# Patient Record
Sex: MALE | Race: NATIVE HAWAIIAN OR OTHER PACIFIC ISLANDER | Employment: FULL TIME | URBAN - METROPOLITAN AREA
[De-identification: names, ages, dates, MRNs, and addresses within clinical notes are randomized per-mention and may not be internally consistent; named-entity substitution may affect disease eponyms.]

---

## 2021-04-16 ENCOUNTER — APPOINTMENT (OUTPATIENT)
Dept: GENERAL RADIOLOGY | Age: 46
DRG: 177 | End: 2021-04-16
Attending: EMERGENCY MEDICINE

## 2021-04-16 ENCOUNTER — HOSPITAL ENCOUNTER (INPATIENT)
Age: 46
LOS: 5 days | Discharge: HOME | DRG: 177 | End: 2021-04-21
Attending: EMERGENCY MEDICINE | Admitting: INTERNAL MEDICINE

## 2021-04-16 DIAGNOSIS — U07.1 COVID-19: ICD-10-CM

## 2021-04-16 DIAGNOSIS — R09.02 HYPOXIA: Primary | ICD-10-CM

## 2021-04-16 PROBLEM — R65.10 SIRS (SYSTEMIC INFLAMMATORY RESPONSE SYNDROME) (HCC): Status: ACTIVE | Noted: 2021-04-16

## 2021-04-16 LAB
ALBUMIN SERPL-MCNC: 3.4 G/DL (ref 3.5–5)
ALBUMIN/GLOB SERPL: 0.8 {RATIO} (ref 1.1–2.2)
ALP SERPL-CCNC: 75 U/L (ref 45–117)
ALT SERPL-CCNC: 36 U/L (ref 12–78)
ANION GAP SERPL CALC-SCNC: 4 MMOL/L (ref 5–15)
AST SERPL-CCNC: 37 U/L (ref 15–37)
BASOPHILS # BLD: 0 K/UL (ref 0–0.1)
BASOPHILS NFR BLD: 0 % (ref 0–1)
BILIRUB SERPL-MCNC: 0.4 MG/DL (ref 0.2–1)
BUN SERPL-MCNC: 8 MG/DL (ref 6–20)
BUN/CREAT SERPL: 12 (ref 12–20)
CALCIUM SERPL-MCNC: 8.4 MG/DL (ref 8.5–10.1)
CHLORIDE SERPL-SCNC: 105 MMOL/L (ref 97–108)
CO2 SERPL-SCNC: 27 MMOL/L (ref 21–32)
COMMENT, HOLDF: NORMAL
COVID-19 RAPID TEST, COVR: DETECTED
CREAT SERPL-MCNC: 0.67 MG/DL (ref 0.7–1.3)
CRP SERPL-MCNC: 8.71 MG/DL (ref 0–0.6)
CRP SERPL-MCNC: 9.09 MG/DL (ref 0–0.6)
D DIMER PPP FEU-MCNC: 0.57 MG/L FEU (ref 0–0.65)
DIFFERENTIAL METHOD BLD: ABNORMAL
EOSINOPHIL # BLD: 0 K/UL (ref 0–0.4)
EOSINOPHIL NFR BLD: 0 % (ref 0–7)
ERYTHROCYTE [DISTWIDTH] IN BLOOD BY AUTOMATED COUNT: 12.3 % (ref 11.5–14.5)
GLOBULIN SER CALC-MCNC: 4.5 G/DL (ref 2–4)
GLUCOSE SERPL-MCNC: 109 MG/DL (ref 65–100)
HCT VFR BLD AUTO: 46.1 % (ref 36.6–50.3)
HGB BLD-MCNC: 16 G/DL (ref 12.1–17)
IMM GRANULOCYTES # BLD AUTO: 0 K/UL (ref 0–0.04)
IMM GRANULOCYTES NFR BLD AUTO: 0 % (ref 0–0.5)
LYMPHOCYTES # BLD: 1 K/UL (ref 0.8–3.5)
LYMPHOCYTES NFR BLD: 15 % (ref 12–49)
MCH RBC QN AUTO: 30.2 PG (ref 26–34)
MCHC RBC AUTO-ENTMCNC: 34.7 G/DL (ref 30–36.5)
MCV RBC AUTO: 87.1 FL (ref 80–99)
MONOCYTES # BLD: 0.4 K/UL (ref 0–1)
MONOCYTES NFR BLD: 7 % (ref 5–13)
NEUTS SEG # BLD: 5 K/UL (ref 1.8–8)
NEUTS SEG NFR BLD: 78 % (ref 32–75)
NRBC # BLD: 0 K/UL (ref 0–0.01)
NRBC BLD-RTO: 0 PER 100 WBC
PLATELET # BLD AUTO: 198 K/UL (ref 150–400)
PMV BLD AUTO: 10.2 FL (ref 8.9–12.9)
POTASSIUM SERPL-SCNC: 3.6 MMOL/L (ref 3.5–5.1)
PROCALCITONIN SERPL-MCNC: <0.05 NG/ML
PROT SERPL-MCNC: 7.9 G/DL (ref 6.4–8.2)
RBC # BLD AUTO: 5.29 M/UL (ref 4.1–5.7)
SAMPLES BEING HELD,HOLD: NORMAL
SODIUM SERPL-SCNC: 136 MMOL/L (ref 136–145)
SOURCE, COVRS: ABNORMAL
TROPONIN I SERPL-MCNC: <0.05 NG/ML
TROPONIN I SERPL-MCNC: <0.05 NG/ML
WBC # BLD AUTO: 6.5 K/UL (ref 4.1–11.1)

## 2021-04-16 PROCEDURE — 36415 COLL VENOUS BLD VENIPUNCTURE: CPT

## 2021-04-16 PROCEDURE — 86140 C-REACTIVE PROTEIN: CPT

## 2021-04-16 PROCEDURE — 71045 X-RAY EXAM CHEST 1 VIEW: CPT

## 2021-04-16 PROCEDURE — 74011250636 HC RX REV CODE- 250/636: Performed by: INTERNAL MEDICINE

## 2021-04-16 PROCEDURE — 99284 EMERGENCY DEPT VISIT MOD MDM: CPT

## 2021-04-16 PROCEDURE — XW033E5 INTRODUCTION OF REMDESIVIR ANTI-INFECTIVE INTO PERIPHERAL VEIN, PERCUTANEOUS APPROACH, NEW TECHNOLOGY GROUP 5: ICD-10-PCS | Performed by: INTERNAL MEDICINE

## 2021-04-16 PROCEDURE — 86738 MYCOPLASMA ANTIBODY: CPT

## 2021-04-16 PROCEDURE — 65660000000 HC RM CCU STEPDOWN

## 2021-04-16 PROCEDURE — 74011250637 HC RX REV CODE- 250/637: Performed by: INTERNAL MEDICINE

## 2021-04-16 PROCEDURE — 80053 COMPREHEN METABOLIC PANEL: CPT

## 2021-04-16 PROCEDURE — 93005 ELECTROCARDIOGRAM TRACING: CPT

## 2021-04-16 PROCEDURE — 74011000258 HC RX REV CODE- 258: Performed by: INTERNAL MEDICINE

## 2021-04-16 PROCEDURE — 85379 FIBRIN DEGRADATION QUANT: CPT

## 2021-04-16 PROCEDURE — 87635 SARS-COV-2 COVID-19 AMP PRB: CPT

## 2021-04-16 PROCEDURE — 85025 COMPLETE CBC W/AUTO DIFF WBC: CPT

## 2021-04-16 PROCEDURE — 74011000250 HC RX REV CODE- 250: Performed by: INTERNAL MEDICINE

## 2021-04-16 PROCEDURE — 84145 PROCALCITONIN (PCT): CPT

## 2021-04-16 PROCEDURE — 84484 ASSAY OF TROPONIN QUANT: CPT

## 2021-04-16 RX ORDER — SODIUM CHLORIDE 0.9 % (FLUSH) 0.9 %
5-40 SYRINGE (ML) INJECTION EVERY 8 HOURS
Status: DISCONTINUED | OUTPATIENT
Start: 2021-04-16 | End: 2021-04-21 | Stop reason: HOSPADM

## 2021-04-16 RX ORDER — HYDRALAZINE HYDROCHLORIDE 20 MG/ML
20 INJECTION INTRAMUSCULAR; INTRAVENOUS
Status: DISCONTINUED | OUTPATIENT
Start: 2021-04-16 | End: 2021-04-21 | Stop reason: HOSPADM

## 2021-04-16 RX ORDER — NALOXONE HYDROCHLORIDE 0.4 MG/ML
0.4 INJECTION, SOLUTION INTRAMUSCULAR; INTRAVENOUS; SUBCUTANEOUS AS NEEDED
Status: DISCONTINUED | OUTPATIENT
Start: 2021-04-16 | End: 2021-04-21 | Stop reason: HOSPADM

## 2021-04-16 RX ORDER — SODIUM CHLORIDE 9 MG/ML
75 INJECTION, SOLUTION INTRAVENOUS CONTINUOUS
Status: DISPENSED | OUTPATIENT
Start: 2021-04-16 | End: 2021-04-17

## 2021-04-16 RX ORDER — SODIUM CHLORIDE 0.9 % (FLUSH) 0.9 %
5-40 SYRINGE (ML) INJECTION AS NEEDED
Status: DISCONTINUED | OUTPATIENT
Start: 2021-04-16 | End: 2021-04-21 | Stop reason: HOSPADM

## 2021-04-16 RX ORDER — BENZONATATE 100 MG/1
100 CAPSULE ORAL
Status: DISCONTINUED | OUTPATIENT
Start: 2021-04-16 | End: 2021-04-21 | Stop reason: HOSPADM

## 2021-04-16 RX ORDER — DEXAMETHASONE 6 MG/1
6 TABLET ORAL DAILY
Status: DISCONTINUED | OUTPATIENT
Start: 2021-04-17 | End: 2021-04-21 | Stop reason: HOSPADM

## 2021-04-16 RX ORDER — ONDANSETRON 2 MG/ML
4 INJECTION INTRAMUSCULAR; INTRAVENOUS
Status: DISCONTINUED | OUTPATIENT
Start: 2021-04-16 | End: 2021-04-21 | Stop reason: HOSPADM

## 2021-04-16 RX ORDER — HYDROCODONE BITARTRATE AND ACETAMINOPHEN 5; 325 MG/1; MG/1
1 TABLET ORAL
Status: DISCONTINUED | OUTPATIENT
Start: 2021-04-16 | End: 2021-04-21 | Stop reason: HOSPADM

## 2021-04-16 RX ORDER — ACETAMINOPHEN 325 MG/1
650 TABLET ORAL
Status: DISCONTINUED | OUTPATIENT
Start: 2021-04-16 | End: 2021-04-21 | Stop reason: HOSPADM

## 2021-04-16 RX ORDER — ZINC GLUCONATE 50 MG
50 TABLET ORAL DAILY
Status: DISCONTINUED | OUTPATIENT
Start: 2021-04-17 | End: 2021-04-21 | Stop reason: HOSPADM

## 2021-04-16 RX ORDER — ASCORBIC ACID 500 MG
500 TABLET ORAL 2 TIMES DAILY
Status: DISCONTINUED | OUTPATIENT
Start: 2021-04-16 | End: 2021-04-21 | Stop reason: HOSPADM

## 2021-04-16 RX ORDER — HYDROCODONE POLISTIREX AND CHLORPHENIRAMINE POLISTIREX 10; 8 MG/5ML; MG/5ML
5 SUSPENSION, EXTENDED RELEASE ORAL ONCE
Status: COMPLETED | OUTPATIENT
Start: 2021-04-16 | End: 2021-04-16

## 2021-04-16 RX ORDER — ENOXAPARIN SODIUM 100 MG/ML
40 INJECTION SUBCUTANEOUS EVERY 24 HOURS
Status: DISCONTINUED | OUTPATIENT
Start: 2021-04-16 | End: 2021-04-17

## 2021-04-16 RX ADMIN — Medication 10 ML: at 21:16

## 2021-04-16 RX ADMIN — HYDROCODONE POLISTIREX AND CHLORPHENIRAMINE POLISTIREX 5 ML: 10; 8 SUSPENSION, EXTENDED RELEASE ORAL at 20:33

## 2021-04-16 RX ADMIN — ENOXAPARIN SODIUM 40 MG: 40 INJECTION SUBCUTANEOUS at 19:01

## 2021-04-16 RX ADMIN — SODIUM CHLORIDE 75 ML/HR: 9 INJECTION, SOLUTION INTRAVENOUS at 18:37

## 2021-04-16 RX ADMIN — AZITHROMYCIN MONOHYDRATE 500 MG: 500 INJECTION, POWDER, LYOPHILIZED, FOR SOLUTION INTRAVENOUS at 18:37

## 2021-04-16 RX ADMIN — OXYCODONE HYDROCHLORIDE AND ACETAMINOPHEN 500 MG: 500 TABLET ORAL at 18:37

## 2021-04-16 RX ADMIN — Medication 10 ML: at 18:37

## 2021-04-16 RX ADMIN — REMDESIVIR 200 MG: 100 INJECTION, POWDER, LYOPHILIZED, FOR SOLUTION INTRAVENOUS at 20:34

## 2021-04-16 NOTE — ED PROVIDER NOTES
39-year-old gentleman without any significant medical history presents to the emergency department today with chief complaint of shortness of breath and cough. This is been going on for approximately past 5 to 6 days. He denies any fevers. He endorses chest pain. His shortness of breath is significantly worse with exertion. He denies any sick contacts or extremity edema. The history is provided by the patient and medical records. A  was used. Respiratory Distress  This is a new problem. The current episode started more than 2 days ago. The problem has been gradually worsening. Associated symptoms include sore throat, cough and chest pain. Pertinent negatives include no fever, no headaches, no vomiting, no abdominal pain, no rash, no leg pain and no leg swelling. No past medical history on file. No past surgical history on file. No family history on file.     Social History     Socioeconomic History    Marital status: Not on file     Spouse name: Not on file    Number of children: Not on file    Years of education: Not on file    Highest education level: Not on file   Occupational History    Not on file   Social Needs    Financial resource strain: Not on file    Food insecurity     Worry: Not on file     Inability: Not on file    Transportation needs     Medical: Not on file     Non-medical: Not on file   Tobacco Use    Smoking status: Not on file   Substance and Sexual Activity    Alcohol use: Not on file    Drug use: Not on file    Sexual activity: Not on file   Lifestyle    Physical activity     Days per week: Not on file     Minutes per session: Not on file    Stress: Not on file   Relationships    Social connections     Talks on phone: Not on file     Gets together: Not on file     Attends Hindu service: Not on file     Active member of club or organization: Not on file     Attends meetings of clubs or organizations: Not on file     Relationship status: Not on file    Intimate partner violence     Fear of current or ex partner: Not on file     Emotionally abused: Not on file     Physically abused: Not on file     Forced sexual activity: Not on file   Other Topics Concern    Not on file   Social History Narrative    Not on file         ALLERGIES: Patient has no known allergies. Review of Systems   Constitutional: Negative for fatigue and fever. HENT: Positive for sore throat. Negative for sneezing. Respiratory: Positive for cough and shortness of breath. Cardiovascular: Positive for chest pain. Negative for leg swelling. Gastrointestinal: Negative for abdominal pain, diarrhea, nausea and vomiting. Genitourinary: Negative for difficulty urinating and dysuria. Musculoskeletal: Negative for arthralgias and myalgias. Skin: Negative for color change and rash. Neurological: Negative for weakness and headaches. Psychiatric/Behavioral: Negative for agitation and behavioral problems. Vitals:    04/16/21 1513   BP: (!) 148/80   Pulse: (!) 121   Resp: (!) 35   Temp: 97.7 °F (36.5 °C)   SpO2: (!) 86%            Physical Exam  Vitals signs and nursing note reviewed. Constitutional:       General: He is not in acute distress. Appearance: He is well-developed. He is ill-appearing. He is not toxic-appearing or diaphoretic. HENT:      Head: Normocephalic and atraumatic. Nose: Nose normal.      Mouth/Throat:      Mouth: Mucous membranes are moist.      Pharynx: Oropharynx is clear. Eyes:      Extraocular Movements: Extraocular movements intact. Conjunctiva/sclera: Conjunctivae normal.      Pupils: Pupils are equal, round, and reactive to light. Neck:      Musculoskeletal: Normal range of motion and neck supple. No neck rigidity or muscular tenderness. Cardiovascular:      Rate and Rhythm: Regular rhythm. Tachycardia present. Pulses: Normal pulses. Heart sounds: No murmur.    Pulmonary:      Effort: Tachypnea and respiratory distress present. Breath sounds: Normal breath sounds. No stridor or decreased air movement. No wheezing. Chest:      Chest wall: No mass or tenderness. Abdominal:      General: There is no distension. Palpations: Abdomen is soft. Tenderness: There is no abdominal tenderness. There is no guarding or rebound. Musculoskeletal: Normal range of motion. General: No swelling, tenderness, deformity or signs of injury. Right lower leg: He exhibits no tenderness. No edema. Left lower leg: He exhibits no tenderness. No edema. Skin:     General: Skin is warm and dry. Capillary Refill: Capillary refill takes less than 2 seconds. Neurological:      General: No focal deficit present. Mental Status: He is alert and oriented to person, place, and time. Psychiatric:         Mood and Affect: Mood normal.         Behavior: Behavior normal.          MDM  Number of Diagnoses or Management Options  Diagnosis management comments: 78-year-old gentleman presents as above with Covid with hypoxia. Plan to admit to the hospital for further management. Amount and/or Complexity of Data Reviewed  Clinical lab tests: reviewed  Tests in the radiology section of CPT®: reviewed           Procedures    Perfect Serve Consult for Admission  5:15 PM    ED Room Number: ER20/20  Patient Name and age: Kameron Zeng 48 y.o.  male  Working Diagnosis:   1. Hypoxia    2.  COVID-19        COVID-19 Suspicion:  yes  Sepsis present:  no  Reassessment needed: N/A  Code Status:  Full Code  Readmission: no  Isolation Requirements:  yes  Recommended Level of Care:  telemetry  Department:[facility] ED - [phone]  Other: Patient has been sick for approximately 6 days

## 2021-04-16 NOTE — ED TRIAGE NOTES
Montserratian intrepreter used:   Pt c/o difficulty breathing, fever, and a cough for 5-6 days. Pt's breathing labored and SPO2 sats on RA in triage were 86-87%. Placed on 3L NC in triage. Also co chest pain.

## 2021-04-16 NOTE — ED NOTES
TRANSFER - OUT REPORT:    Verbal report given to Daina MORELOS(name) on [facility]  being transferred to 5th Floor(unit) for routine progression of care       Report consisted of patients Situation, Background, Assessment and   Recommendations(SBAR). Information from the following report(s) SBAR, ED Summary and MAR was reviewed with the receiving nurse. Lines:   Peripheral IV 04/16/21 Right Antecubital (Active)   Site Assessment Clean, dry, & intact 04/16/21 1626   Phlebitis Assessment 0 04/16/21 1626   Infiltration Assessment 0 04/16/21 1626   Dressing Status Clean, dry, & intact 04/16/21 1626   Dressing Type Tape 04/16/21 1626   Hub Color/Line Status Patent 04/16/21 1626   Action Taken Blood drawn 04/16/21 1626        Opportunity for questions and clarification was provided.       Patient transported with:   [employer]

## 2021-04-16 NOTE — PROGRESS NOTES
Bedside and Verbal shift change report given to SHERI IQBAL [facility] TAMY BABIN (oncoming nurse) by Maryan White RN (offgoing nurse). Report included the following information SBAR, Intake/Output, MAR and Recent Results.

## 2021-04-17 LAB
ALBUMIN SERPL-MCNC: 2.9 G/DL (ref 3.5–5)
ALBUMIN/GLOB SERPL: 0.7 {RATIO} (ref 1.1–2.2)
ALP SERPL-CCNC: 69 U/L (ref 45–117)
ALT SERPL-CCNC: 37 U/L (ref 12–78)
ANION GAP SERPL CALC-SCNC: 4 MMOL/L (ref 5–15)
AST SERPL-CCNC: 44 U/L (ref 15–37)
BASOPHILS # BLD: 0 K/UL (ref 0–0.1)
BASOPHILS NFR BLD: 0 % (ref 0–1)
BILIRUB SERPL-MCNC: 0.3 MG/DL (ref 0.2–1)
BUN SERPL-MCNC: 11 MG/DL (ref 6–20)
BUN/CREAT SERPL: 17 (ref 12–20)
CALCIUM SERPL-MCNC: 7.9 MG/DL (ref 8.5–10.1)
CHLORIDE SERPL-SCNC: 106 MMOL/L (ref 97–108)
CO2 SERPL-SCNC: 28 MMOL/L (ref 21–32)
COMMENT, HOLDF: NORMAL
CREAT SERPL-MCNC: 0.64 MG/DL (ref 0.7–1.3)
D DIMER PPP FEU-MCNC: 1.22 MG/L FEU (ref 0–0.65)
DIFFERENTIAL METHOD BLD: ABNORMAL
EOSINOPHIL # BLD: 0 K/UL (ref 0–0.4)
EOSINOPHIL NFR BLD: 0 % (ref 0–7)
ERYTHROCYTE [DISTWIDTH] IN BLOOD BY AUTOMATED COUNT: 12.1 % (ref 11.5–14.5)
FIBRINOGEN PPP-MCNC: 593 MG/DL (ref 200–475)
GLOBULIN SER CALC-MCNC: 4.2 G/DL (ref 2–4)
GLUCOSE SERPL-MCNC: 102 MG/DL (ref 65–100)
HCT VFR BLD AUTO: 43.9 % (ref 36.6–50.3)
HGB BLD-MCNC: 14.7 G/DL (ref 12.1–17)
IMM GRANULOCYTES # BLD AUTO: 0 K/UL (ref 0–0.04)
IMM GRANULOCYTES NFR BLD AUTO: 1 % (ref 0–0.5)
LDH SERPL L TO P-CCNC: 285 U/L (ref 85–241)
LYMPHOCYTES # BLD: 1.2 K/UL (ref 0.8–3.5)
LYMPHOCYTES NFR BLD: 20 % (ref 12–49)
MCH RBC QN AUTO: 29.7 PG (ref 26–34)
MCHC RBC AUTO-ENTMCNC: 33.5 G/DL (ref 30–36.5)
MCV RBC AUTO: 88.7 FL (ref 80–99)
MONOCYTES # BLD: 0.4 K/UL (ref 0–1)
MONOCYTES NFR BLD: 6 % (ref 5–13)
NEUTS SEG # BLD: 4.4 K/UL (ref 1.8–8)
NEUTS SEG NFR BLD: 73 % (ref 32–75)
NRBC # BLD: 0 K/UL (ref 0–0.01)
NRBC BLD-RTO: 0 PER 100 WBC
PLATELET # BLD AUTO: 224 K/UL (ref 150–400)
PMV BLD AUTO: 10.7 FL (ref 8.9–12.9)
POTASSIUM SERPL-SCNC: 3.6 MMOL/L (ref 3.5–5.1)
PROT SERPL-MCNC: 7.1 G/DL (ref 6.4–8.2)
RBC # BLD AUTO: 4.95 M/UL (ref 4.1–5.7)
SAMPLES BEING HELD,HOLD: NORMAL
SODIUM SERPL-SCNC: 138 MMOL/L (ref 136–145)
TROPONIN I SERPL-MCNC: <0.05 NG/ML
WBC # BLD AUTO: 6 K/UL (ref 4.1–11.1)

## 2021-04-17 PROCEDURE — 84484 ASSAY OF TROPONIN QUANT: CPT

## 2021-04-17 PROCEDURE — 77030027138 HC INCENT SPIROMETER -A

## 2021-04-17 PROCEDURE — 74011250636 HC RX REV CODE- 250/636: Performed by: PHYSICIAN ASSISTANT

## 2021-04-17 PROCEDURE — 36415 COLL VENOUS BLD VENIPUNCTURE: CPT

## 2021-04-17 PROCEDURE — 74011250636 HC RX REV CODE- 250/636: Performed by: INTERNAL MEDICINE

## 2021-04-17 PROCEDURE — 85384 FIBRINOGEN ACTIVITY: CPT

## 2021-04-17 PROCEDURE — 85379 FIBRIN DEGRADATION QUANT: CPT

## 2021-04-17 PROCEDURE — 85025 COMPLETE CBC W/AUTO DIFF WBC: CPT

## 2021-04-17 PROCEDURE — 74011250637 HC RX REV CODE- 250/637: Performed by: INTERNAL MEDICINE

## 2021-04-17 PROCEDURE — 74011000258 HC RX REV CODE- 258: Performed by: INTERNAL MEDICINE

## 2021-04-17 PROCEDURE — 74011000250 HC RX REV CODE- 250: Performed by: INTERNAL MEDICINE

## 2021-04-17 PROCEDURE — 65660000000 HC RM CCU STEPDOWN

## 2021-04-17 PROCEDURE — 80053 COMPREHEN METABOLIC PANEL: CPT

## 2021-04-17 PROCEDURE — 83615 LACTATE (LD) (LDH) ENZYME: CPT

## 2021-04-17 PROCEDURE — 94760 N-INVAS EAR/PLS OXIMETRY 1: CPT

## 2021-04-17 PROCEDURE — 77010033678 HC OXYGEN DAILY

## 2021-04-17 RX ORDER — ENOXAPARIN SODIUM 100 MG/ML
40 INJECTION SUBCUTANEOUS EVERY 12 HOURS
Status: DISCONTINUED | OUTPATIENT
Start: 2021-04-17 | End: 2021-04-21 | Stop reason: HOSPADM

## 2021-04-17 RX ADMIN — HYDROCODONE BITARTRATE AND ACETAMINOPHEN 1 TABLET: 5; 325 TABLET ORAL at 13:09

## 2021-04-17 RX ADMIN — Medication 10 ML: at 06:33

## 2021-04-17 RX ADMIN — Medication 10 ML: at 11:51

## 2021-04-17 RX ADMIN — Medication 10 ML: at 21:25

## 2021-04-17 RX ADMIN — OXYCODONE HYDROCHLORIDE AND ACETAMINOPHEN 500 MG: 500 TABLET ORAL at 17:11

## 2021-04-17 RX ADMIN — REMDESIVIR 100 MG: 100 INJECTION, POWDER, LYOPHILIZED, FOR SOLUTION INTRAVENOUS at 17:11

## 2021-04-17 RX ADMIN — ENOXAPARIN SODIUM 40 MG: 40 INJECTION SUBCUTANEOUS at 13:06

## 2021-04-17 RX ADMIN — BENZOCAINE AND MENTHOL 1 LOZENGE: 15; 3.6 LOZENGE ORAL at 09:36

## 2021-04-17 RX ADMIN — BENZOCAINE AND MENTHOL 1 LOZENGE: 15; 3.6 LOZENGE ORAL at 13:10

## 2021-04-17 RX ADMIN — DEXAMETHASONE 6 MG: 6 TABLET ORAL at 08:53

## 2021-04-17 RX ADMIN — Medication 50 MG: at 08:53

## 2021-04-17 RX ADMIN — OXYCODONE HYDROCHLORIDE AND ACETAMINOPHEN 500 MG: 500 TABLET ORAL at 08:53

## 2021-04-17 RX ADMIN — HYDROCODONE BITARTRATE AND ACETAMINOPHEN 1 TABLET: 5; 325 TABLET ORAL at 17:19

## 2021-04-17 NOTE — H&P
[facility]  [address], [facility] [address]  [phone]    Admission History and Physical      NAME:  Tim Aguiar   :   1975   MRN:  [id number]     PCP:  None     Date/Time:  2021         Subjective:     CHIEF COMPLAINT: \"I can't stop coughing and I am short of breath\"     HISTORY OF PRESENT ILLNESS:     Mr. Markel Mallory is a 48 y.o.  male with no known/reported PMH admitted for acute respiratory failure with hypoxia and COVID PNA. Per pt, has had roughly 6 days of progressively worsening cough, SOB, malaise. PMH   None     No past surgical history on file. SH:  Denies alcohol or drug use     FH   Brother => DM      No Known Allergies     No PTA meds     Review of Systems:  (bold if positive, if negative)    Gen:  Eyes:  ENT:  CVS:  Pulm:  GI:    :    MS:  Skin:  Psych:  Endo:    Hem:  Renal:    Neuro:     Cough, dyspnea      Objective:      VITALS:    Vital signs reviewed; most recent are:    Visit Vitals  /83 (BP 1 Location: Left upper arm, BP Patient Position: At rest)   Pulse 93   Temp 98 °F (36.7 °C)   Resp 24   Ht 5' (1.524 m)   Wt 91.4 kg (201 lb 6.4 oz)   SpO2 94%   BMI 39.33 kg/m²     SpO2 Readings from Last 6 Encounters:   21 94%        No intake or output data in the 24 hours ending 21         Exam:     Physical Exam:    Gen: Tachypneic. Increased WOB  HEENT:  Pink conjunctivae, PERRL, hearing intact to voice, moist mucous membranes  Neck:  Supple, without masses, thyroid non-tender  Resp: Coarse breath sounds diffusely.  No wheezing appreciated   Card:  No murmurs, normal S1, S2 without thrills, bruits or peripheral edema  Abd:  Soft, non-tender, non-distended, normoactive bowel sounds are present, no palpable organomegaly  Lymph:  No cervical adenopathy  Musc:  No cyanosis or clubbing  Skin:  No rashes or ulcers, skin turgor is good  Neuro:  Cranial nerves 3-12 are grossly intact,  strength is 5/5 bilaterally, dorsi / plantarflexion strength is 5/5 bilaterally, follows commands appropriately  Psych:  Alert with good insight. Oriented to person, place, and time       Labs:    Recent Labs     04/16/21  1617   WBC 6.5   HGB 16.0   HCT 46.1        Recent Labs     04/16/21  1617      K 3.6      CO2 27   *   BUN 8   CREA 0.67*   CA 8.4*   ALB 3.4*   ALT 36     No components found for: GLPOC  No results for input(s): PH, PCO2, PO2, HCO3, FIO2 in the last 72 hours. No results for input(s): INR, INREXT in the last 72 hours. CXR =>   Diffuse patchy opacification of the lungs most likely related to infection        Assessment/Plan:     Acute respiratory failure with hypoxia - 2/2 COVID PNA. D-dimer negative so lower suspicion for PE.  Procalcitonin low so unlikely bacterial PNA   -admit   -start O2   -start decadron 6mg PO daily x 10 days   -add azithro for now; de-escalate as able   -start remdesivir   -trend inflammatory markers   -incentive spirometry, OOB, prone   -if clinically worsens may be a candidate for Actemra       COVID-19 (4/16/2021)  -see above       SIRS (systemic inflammatory response syndrome) ([facility] [address] (4/16/2021) - likely 2/2 #1   -gently hydrate   -IV antibiotics as above     Surrogate decision maker: Dad     Total time spent with patient: 79 [phone] [facility] discussed with: Patient and Family    Discussed:  Code Status, Care Plan and D/C Planning    Prophylaxis:  Lovenox    Probable Disposition:  Home w/Family           ___________________________________________________    Attending Physician: Yenni Cooper MD

## 2021-04-17 NOTE — PROGRESS NOTES
[address]  [address]  [phone]      Medical Progress Note      NAME: Marci Koehler   :  1975  MRM:  [id number]    Date of service: 2021  7:25 AM       Assessment and Plan:   1. Acute respiratory failure with hypoxia - 2/2 COVID PNA. D-dimer negative so lower suspicion for PE. Procalcitonin low so unlikely bacterial PNA. Continue supplement oxygen to keep SAO2 > 90%     2. COVID-19 pneumonia (2021). Continue decadron 6mg PO daily x 10 days, remdesivir, ascorbic acid, zinc, trend inflammatory markers, incentive spirometry, OOB, prone   -if clinically worsens may be a candidate for Actemra              Subjective:     Chief Complaint[de-identified] Patient was seen and examined as a follow up for COVID 19 pneumonia. Chart was reviewed. c/o cough and SOB     ROS:  (bold if positive, if negative)    Tolerating PT  Tolerating Diet        Objective:     Last 24hrs VS reviewed since prior progress note. Most recent are:    Visit Vitals  /80 (BP 1 Location: Left upper arm, BP Patient Position: Sitting; At rest)   Pulse 94   Temp 98.6 °F (37 °C)   Resp 16   Ht 5' (1.524 m)   Wt 91.4 kg (201 lb 6.4 oz)   SpO2 97%   BMI 39.33 kg/m²     SpO2 Readings from Last 6 Encounters:   21 97%    O2 Flow Rate (L/min): 4 l/min   No intake or output data in the 24 hours ending 21 0725     Physical Exam:    Gen:  Well-developed, well-nourished, in no acute distress  HEENT:  Pink conjunctivae, PERRL, hearing intact to voice, moist mucous membranes  Neck:  Supple, without masses, thyroid non-tender  Resp:  No accessory muscle use, clear breath sounds without wheezes rales or rhonchi  Card:  No murmurs, normal S1, S2 without thrills, bruits or peripheral edema  Abd:  Soft, non-tender, non-distended, normoactive bowel sounds are present, no palpable organomegaly and no detectable hernias  Lymph:  No cervical or inguinal adenopathy  Musc:  No cyanosis or clubbing  Skin:  No rashes or ulcers, skin turgor is good  Neuro:  Cranial nerves are grossly intact, no focal motor weakness, follows commands appropriately  Psych:  Good insight, oriented to person, place and time, alert  __________________________________________________________________  Medications Reviewed: (see below)  Medications:     Current Facility-Administered Medications   Medication Dose Route Frequency    sodium chloride (NS) flush 5-40 mL  5-40 mL IntraVENous Q8H    sodium chloride (NS) flush 5-40 mL  5-40 mL IntraVENous PRN    enoxaparin (LOVENOX) injection 40 mg  40 mg SubCUTAneous Q24H    acetaminophen (TYLENOL) tablet 650 mg  650 mg Oral Q4H PRN    HYDROcodone-acetaminophen (NORCO) 5-325 mg per tablet 1 Tab  1 Tab Oral Q4H PRN    naloxone (NARCAN) injection 0.4 mg  0.4 mg IntraVENous PRN    ondansetron (ZOFRAN) injection 4 mg  4 mg IntraVENous Q4H PRN    ascorbic acid (vitamin C) (VITAMIN C) tablet 500 mg  500 mg Oral BID    zinc gluconate tablet 50 mg  50 mg Oral DAILY    dexAMETHasone (DECADRON) tablet 6 mg  6 mg Oral DAILY    0.9% sodium chloride infusion  75 mL/hr IntraVENous CONTINUOUS    hydrALAZINE (APRESOLINE) 20 mg/mL injection 20 mg  20 mg IntraVENous Q6H PRN    azithromycin (ZITHROMAX) 500 mg in 0.9% sodium chloride 250 mL (VIAL-MATE)  500 mg IntraVENous Q24H    remdesivir 100 mg in 0.9% sodium chloride 250 mL IVPB  100 mg IntraVENous Q24H    benzonatate (TESSALON) capsule 100 mg  100 mg Oral TID PRN        Lab Data Reviewed: (see below)  Lab Review:     Recent Labs     04/17/21  0504 04/16/21  1617   WBC 6.0 6.5   HGB 14.7 16.0   HCT 43.9 46.1    198     Recent Labs     04/17/21  0504 04/16/21  1617    136   K 3.6 3.6    105   CO2 28 27   * 109*   BUN 11 8   CREA 0.64* 0.67*   CA 7.9* 8.4*   ALB 2.9* 3.4*   TBILI 0.3 0.4   ALT 37 36     No results found for: GLUCPOC  No results for input(s): PH, PCO2, PO2, HCO3, FIO2 in the last 72 hours. No results for input(s): INR, INREXT in the last 72 hours. All Micro Results     Procedure Component Value Units Date/Time    COVID-19 RAPID TEST [[id number]]  (Abnormal) Collected: 04/16/21 1617    Order Status: Completed Specimen: Nasopharyngeal Updated: 04/16/21 1640     Specimen source Nasopharyngeal        COVID-19 rapid test Detected        Comment: CALLED TO AND READ BACK BY  Scarlet Lawrence RN AT 1640 BY AT  Rapid [employer] ID Now       The specimen is POSITIVE for SARS-CoV-2, the novel coronavirus associated with COVID-19. This test has been authorized by the FDA under an Emergency Use Authorization (EUA) for use by authorized laboratories. Fact sheet for Healthcare Providers: ConventionUpdate.co.nz  Fact sheet for Patients: ConventionUpdate.co.nz       Methodology: Isothermal Nucleic Acid Amplification               I have reviewed notes of prior 24hr. Other pertinent lab: Total time spent with patient: 28 I personally reviewed chart, notes, data and current medications in the medical record. I have personally examined and treated the patient at bedside during this period.                  Care Plan discussed with: Patient, Nursing Staff and >50% of time spent in counseling and coordination of care    Discussed:  Care Plan    Prophylaxis:  Lovenox    Disposition:  Home w/Family           ___________________________________________________    Attending Physician: Aristeo Howell MD

## 2021-04-17 NOTE — CONSULTS
Pulmonary, Critical Care, and Sleep Medicine~Consult Note    Name: Lionel Garcia MRN: [id number]   : 1975 Hospital: [facility]   Date: 2021 12:09 PM Admission: 2021     Impression Plan   1. covid 19 pneumonia  2. Acute hypoxemic respiratory failure 1. cont supp o2 for goal sat>90%  2. cont dex  3. Agree with Remdesivir  4. Trend LFTs, ddimer  5. Increase lovenox to bid    Thank you for the consult     Daily Progression:    47 y/o Faroese-speaking M with no PMH presents with shortness of breath, chest pain, dry cough x 5-6 days. In the ED O2 sats 86-87% on RA. afebrile    Labs: wbc 6, ddimer 1.22, pct <0.05, crp 9.09, ldh 285, covid 19 positive    cxr with diffuse patchy asdz      I have reviewed the labs and previous days notes. ROS: He c/o dry cough, dyspnea on exertion and states his chest feels like it might explode. Had chills prior to admission. Sore throat and chest pain which is reproducible. He denies fevers, loss of taste or smell, leg pain/swelling. No known sick contacts. I weaned him from 4L to 2L o2 with O2 sats 94%. Social hx: nonsmoker    No past medical history on file. No past surgical history on file. Prior to Admission medications    Not on File     No Known Allergies   Social History     Tobacco Use    Smoking status: Not on file   Substance Use Topics    Alcohol use: Not on file      No family history on file.   OBJECTIVE:     Vital Signs:       Visit Vitals  /87 (BP 1 Location: Left upper arm, BP Patient Position: Sitting)   Pulse 95   Temp 98.3 °F (36.8 °C)   Resp 30 Comment: RN notified   Ht 5' (1.524 m)   Wt 91.4 kg (201 lb 6.4 oz)   SpO2 94%   BMI 39.33 kg/m²      Temp (24hrs), Av.2 °F (36.8 °C), Min:97.7 °F (36.5 °C), Max:98.6 °F (37 °C)     Intake/Output:     Last shift:  07 -  1900  In: 240 [P.O.:240]  Out: -     Last 3 shifts: No intake/output data recorded. Intake/Output Summary (Last 24 hours) at 4/17/2021 1209  Last data filed at 4/17/2021 0940  Gross per 24 hour   Intake 240 ml   Output    Net 240 ml       Physical Exam:                                        Exam Findings Other   General: No resp distress noted, appears stated age    HEENT:  No ulcers, moist mucosa    Chest: No pectus deformity, normal chest rise b/l    HEART:  RRR, no murmur    Lungs:  Bibasilar crackles    ABD: Soft/NT    EXT: No cyanosis/clubbing/edema, normal peripheral pulses    Skin: No rashes or ulcers, no mottling    Neuro: A/O x 3        Medications:  Current Facility-Administered Medications   Medication Dose Route Frequency    benzocaine-menthoL (CEPACOL) lozenge 1 Lozenge  1 Lozenge Mucous Membrane PRN    sodium chloride (NS) flush 5-40 mL  5-40 mL IntraVENous Q8H    sodium chloride (NS) flush 5-40 mL  5-40 mL IntraVENous PRN    enoxaparin (LOVENOX) injection 40 mg  40 mg SubCUTAneous Q24H    acetaminophen (TYLENOL) tablet 650 mg  650 mg Oral Q4H PRN    HYDROcodone-acetaminophen (NORCO) 5-325 mg per tablet 1 Tab  1 Tab Oral Q4H PRN    naloxone (NARCAN) injection 0.4 mg  0.4 mg IntraVENous PRN    ondansetron (ZOFRAN) injection 4 mg  4 mg IntraVENous Q4H PRN    ascorbic acid (vitamin C) (VITAMIN C) tablet 500 mg  500 mg Oral BID    zinc gluconate tablet 50 mg  50 mg Oral DAILY    dexAMETHasone (DECADRON) tablet 6 mg  6 mg Oral DAILY    0.9% sodium chloride infusion  75 mL/hr IntraVENous CONTINUOUS    hydrALAZINE (APRESOLINE) 20 mg/mL injection 20 mg  20 mg IntraVENous Q6H PRN    remdesivir 100 mg in 0.9% sodium chloride 250 mL IVPB  100 mg IntraVENous Q24H    benzonatate (TESSALON) capsule 100 mg  100 mg Oral TID PRN       Labs:  ABG No results for input(s): PHI, PCO2I, PO2I, HCO3I, SO2I, FIO2I in the last 72 hours.      CBC Recent Labs     04/17/21  0504 04/16/21  1617   WBC 6.0 6.5   HGB 14.7 16.0   HCT 43.9 46.1    198   MCV 88.7 87.1   MCH 29.7 66.6        Metabolic  Panel Recent Labs     04/17/21  0504 04/16/21  1617    136   K 3.6 3.6    105   CO2 28 27   * 109*   BUN 11 8   CREA 0.64* 0.67*   CA 7.9* 8.4*   ALB 2.9* 3.4*   ALT 37 36        Pertinent Labs                Destinee SheltonPhil[place], [place]  4/17/2021

## 2021-04-17 NOTE — PROGRESS NOTES
Patient refused skin check, keeps clothing on, from what writer can see, dry flaky skin, corns and callous to bilateral feet.

## 2021-04-17 NOTE — PROGRESS NOTES
Bedside shift change report given to Anum Pollard (oncoming nurse) by Ely Norton (offgoing nurse). Report included the following information SBAR, Intake/Output, MAR and Recent Results.

## 2021-04-17 NOTE — PROGRESS NOTES
Problem: Airway Clearance - Ineffective  Goal: Achieve or maintain patent airway  Outcome: Progressing Towards Goal     Problem: Gas Exchange - Impaired  Goal: Absence of hypoxia  Outcome: Progressing Towards Goal     Problem: Isolation Precautions - Risk of Spread of Infection  Goal: Prevent transmission of infectious organism to others  Outcome: Progressing Towards Goal

## 2021-04-17 NOTE — PROGRESS NOTES
[facility]: MED RECONCILIATION    Comments/Recommendations:   Appreciate completion of med rec by nursing  No Rx query on file  Patient apparently takes no medications at this time    Allergies: Patient has no known allergies. Thank you,  Narciso Redman

## 2021-04-18 LAB
ALBUMIN SERPL-MCNC: 3.2 G/DL (ref 3.5–5)
ALBUMIN/GLOB SERPL: 0.7 {RATIO} (ref 1.1–2.2)
ALP SERPL-CCNC: 81 U/L (ref 45–117)
ALT SERPL-CCNC: 51 U/L (ref 12–78)
ANION GAP SERPL CALC-SCNC: 4 MMOL/L (ref 5–15)
AST SERPL-CCNC: 40 U/L (ref 15–37)
BILIRUB SERPL-MCNC: 0.4 MG/DL (ref 0.2–1)
BUN SERPL-MCNC: 13 MG/DL (ref 6–20)
BUN/CREAT SERPL: 20 (ref 12–20)
CALCIUM SERPL-MCNC: 8.7 MG/DL (ref 8.5–10.1)
CHLORIDE SERPL-SCNC: 108 MMOL/L (ref 97–108)
CO2 SERPL-SCNC: 26 MMOL/L (ref 21–32)
CREAT SERPL-MCNC: 0.66 MG/DL (ref 0.7–1.3)
CRP SERPL-MCNC: 7.79 MG/DL (ref 0–0.6)
D DIMER PPP FEU-MCNC: 1.7 MG/L FEU (ref 0–0.65)
FIBRINOGEN PPP-MCNC: 604 MG/DL (ref 200–475)
GLOBULIN SER CALC-MCNC: 4.6 G/DL (ref 2–4)
GLUCOSE SERPL-MCNC: 124 MG/DL (ref 65–100)
LDH SERPL L TO P-CCNC: 328 U/L (ref 85–241)
POTASSIUM SERPL-SCNC: 3.9 MMOL/L (ref 3.5–5.1)
PROT SERPL-MCNC: 7.8 G/DL (ref 6.4–8.2)
SODIUM SERPL-SCNC: 138 MMOL/L (ref 136–145)

## 2021-04-18 PROCEDURE — 74011250637 HC RX REV CODE- 250/637: Performed by: INTERNAL MEDICINE

## 2021-04-18 PROCEDURE — 74011250636 HC RX REV CODE- 250/636: Performed by: INTERNAL MEDICINE

## 2021-04-18 PROCEDURE — 74011250636 HC RX REV CODE- 250/636: Performed by: PHYSICIAN ASSISTANT

## 2021-04-18 PROCEDURE — 36415 COLL VENOUS BLD VENIPUNCTURE: CPT

## 2021-04-18 PROCEDURE — 74011000250 HC RX REV CODE- 250: Performed by: INTERNAL MEDICINE

## 2021-04-18 PROCEDURE — 65660000000 HC RM CCU STEPDOWN

## 2021-04-18 PROCEDURE — 85384 FIBRINOGEN ACTIVITY: CPT

## 2021-04-18 PROCEDURE — 85379 FIBRIN DEGRADATION QUANT: CPT

## 2021-04-18 PROCEDURE — 94760 N-INVAS EAR/PLS OXIMETRY 1: CPT

## 2021-04-18 PROCEDURE — 83615 LACTATE (LD) (LDH) ENZYME: CPT

## 2021-04-18 PROCEDURE — 86140 C-REACTIVE PROTEIN: CPT

## 2021-04-18 PROCEDURE — 74011000258 HC RX REV CODE- 258: Performed by: INTERNAL MEDICINE

## 2021-04-18 PROCEDURE — 77010033678 HC OXYGEN DAILY

## 2021-04-18 PROCEDURE — 80053 COMPREHEN METABOLIC PANEL: CPT

## 2021-04-18 RX ADMIN — BENZONATATE 100 MG: 100 CAPSULE ORAL at 12:32

## 2021-04-18 RX ADMIN — REMDESIVIR 100 MG: 100 INJECTION, POWDER, LYOPHILIZED, FOR SOLUTION INTRAVENOUS at 16:49

## 2021-04-18 RX ADMIN — Medication 10 ML: at 06:22

## 2021-04-18 RX ADMIN — HYDROCODONE BITARTRATE AND ACETAMINOPHEN 1 TABLET: 5; 325 TABLET ORAL at 01:02

## 2021-04-18 RX ADMIN — BENZONATATE 100 MG: 100 CAPSULE ORAL at 01:02

## 2021-04-18 RX ADMIN — ENOXAPARIN SODIUM 40 MG: 40 INJECTION SUBCUTANEOUS at 01:02

## 2021-04-18 RX ADMIN — OXYCODONE HYDROCHLORIDE AND ACETAMINOPHEN 500 MG: 500 TABLET ORAL at 16:49

## 2021-04-18 RX ADMIN — Medication 50 MG: at 08:24

## 2021-04-18 RX ADMIN — HYDROCODONE BITARTRATE AND ACETAMINOPHEN 1 TABLET: 5; 325 TABLET ORAL at 23:16

## 2021-04-18 RX ADMIN — ENOXAPARIN SODIUM 40 MG: 40 INJECTION SUBCUTANEOUS at 12:32

## 2021-04-18 RX ADMIN — BENZOCAINE AND MENTHOL 1 LOZENGE: 15; 3.6 LOZENGE ORAL at 08:25

## 2021-04-18 RX ADMIN — BENZONATATE 100 MG: 100 CAPSULE ORAL at 23:16

## 2021-04-18 RX ADMIN — Medication 10 ML: at 22:03

## 2021-04-18 RX ADMIN — Medication 10 ML: at 12:34

## 2021-04-18 RX ADMIN — OXYCODONE HYDROCHLORIDE AND ACETAMINOPHEN 500 MG: 500 TABLET ORAL at 08:24

## 2021-04-18 RX ADMIN — HYDROCODONE BITARTRATE AND ACETAMINOPHEN 1 TABLET: 5; 325 TABLET ORAL at 12:32

## 2021-04-18 RX ADMIN — HYDROCODONE BITARTRATE AND ACETAMINOPHEN 1 TABLET: 5; 325 TABLET ORAL at 16:57

## 2021-04-18 RX ADMIN — HYDROCODONE BITARTRATE AND ACETAMINOPHEN 1 TABLET: 5; 325 TABLET ORAL at 08:24

## 2021-04-18 RX ADMIN — DEXAMETHASONE 6 MG: 6 TABLET ORAL at 08:24

## 2021-04-18 NOTE — PROGRESS NOTES
Bedside shift change report given to 535Oksana Pollard (oncoming nurse) by Joe Iglesias (offgoing nurse). Report included the following information SBAR, Kardex, MAR and Recent Results.

## 2021-04-18 NOTE — PROGRESS NOTES
Bedside shift change report given to Herminia Law (oncoming nurse) by Darshana Anguiano (offgoing nurse). Report included the following information SBAR, Kardex, Intake/Output, MAR and Recent Results.

## 2021-04-18 NOTE — PROGRESS NOTES
Timmy Joseph [facility]  [address] [facility], [place], [address] [place] [address]  [phone]      Medical Progress Note      NAME: Chelsie Riley   :  1975  MRM:  [id number]    Date of service: 2021  7:25 AM       Assessment and Plan:   1. Acute respiratory failure with hypoxia - 2/2 COVID PNA. D-dimer negative so lower suspicion for PE. Procalcitonin low so unlikely bacterial PNA. Continue supplement oxygen to keep SAO2 > 90%     2. COVID-19 pneumonia (2021). Continue decadron 6mg PO daily x 10 days, remdesivir, ascorbic acid, zinc, trend inflammatory markers, incentive spirometry, OOB, prone. Improving        Subjective:     Chief Complaint[de-identified] Patient was seen and examined as a follow up for COVID 19 pneumonia. Chart was reviewed. c/o cough and SOB     ROS:  (bold if positive, if negative)    Tolerating PT  Tolerating Diet        Objective:     Last 24hrs VS reviewed since prior progress note.  Most recent are:    Visit Vitals  /77 (BP 1 Location: Left upper arm, BP Patient Position: At rest)   Pulse 82   Temp 98.1 °F (36.7 °C)   Resp 20   Ht 5' (1.524 m)   Wt 91.4 kg (201 lb 6.4 oz)   SpO2 93%   BMI 39.33 kg/m²     SpO2 Readings from Last 6 Encounters:   21 93%    O2 Flow Rate (L/min): 2 l/min       Intake/Output Summary (Last 24 hours) at 2021 1100  Last data filed at 2021 0925  Gross per 24 hour   Intake 1340 ml   Output 0 ml   Net 1340 ml        Physical Exam:    Gen:  Well-developed, well-nourished, in no acute distress  HEENT:  Pink conjunctivae, PERRL, hearing intact to voice, moist mucous membranes  Neck:  Supple, without masses, thyroid non-tender  Resp:  No accessory muscle use, clear breath sounds without wheezes rales or rhonchi  Card:  No murmurs, normal S1, S2 without thrills, bruits or peripheral edema  Abd:  Soft, non-tender, non-distended, normoactive bowel sounds are present, no palpable organomegaly and no detectable hernias  Lymph:  No cervical or inguinal adenopathy  Musc:  No cyanosis or clubbing  Skin:  No rashes or ulcers, skin turgor is good  Neuro:  Cranial nerves are grossly intact, no focal motor weakness, follows commands appropriately  Psych:  Good insight, oriented to person, place and time, alert  __________________________________________________________________  Medications Reviewed: (see below)  Medications:     Current Facility-Administered Medications   Medication Dose Route Frequency    benzocaine-menthoL (CEPACOL) lozenge 1 Lozenge  1 Lozenge Mucous Membrane PRN    enoxaparin (LOVENOX) injection 40 mg  40 mg SubCUTAneous Q12H    sodium chloride (NS) flush 5-40 mL  5-40 mL IntraVENous Q8H    sodium chloride (NS) flush 5-40 mL  5-40 mL IntraVENous PRN    acetaminophen (TYLENOL) tablet 650 mg  650 mg Oral Q4H PRN    HYDROcodone-acetaminophen (NORCO) 5-325 mg per tablet 1 Tab  1 Tab Oral Q4H PRN    naloxone (NARCAN) injection 0.4 mg  0.4 mg IntraVENous PRN    ondansetron (ZOFRAN) injection 4 mg  4 mg IntraVENous Q4H PRN    ascorbic acid (vitamin C) (VITAMIN C) tablet 500 mg  500 mg Oral BID    zinc gluconate tablet 50 mg  50 mg Oral DAILY    dexAMETHasone (DECADRON) tablet 6 mg  6 mg Oral DAILY    hydrALAZINE (APRESOLINE) 20 mg/mL injection 20 mg  20 mg IntraVENous Q6H PRN    remdesivir 100 mg in 0.9% sodium chloride 250 mL IVPB  100 mg IntraVENous Q24H    benzonatate (TESSALON) capsule 100 mg  100 mg Oral TID PRN        Lab Data Reviewed: (see below)  Lab Review:     Recent Labs     04/17/21  0504 04/16/21  1617   WBC 6.0 6.5   HGB 14.7 16.0   HCT 43.9 46.1    198     Recent Labs     04/18/21  0125 04/17/21  0504 04/16/21  1617    138 136   K 3.9 3.6 3.6    106 105   CO2 26 28 27   * 102* 109*   BUN 13 11 8   CREA 0.66* 0.64* 0.67*   CA 8.7 7.9* 8.4*   ALB 3.2* 2.9* 3.4*   TBILI 0.4 0.3 0.4   ALT 51 37 36     No results found for: GLUCPOC  No results for input(s): PH, PCO2, PO2, HCO3, FIO2 in the last 72 hours. No results for input(s): INR, INREXT, INREXT in the last 72 hours. All Micro Results     Procedure Component Value Units Date/Time    COVID-19 RAPID TEST [[id number]]  (Abnormal) Collected: 04/16/21 1617    Order Status: Completed Specimen: Nasopharyngeal Updated: 04/16/21 1640     Specimen source Nasopharyngeal        COVID-19 rapid test Detected        Comment: CALLED TO AND READ BACK BY  Benoit Hirsch RN AT 1640 BY AT  Rapid [employer] ID Now       The specimen is POSITIVE for SARS-CoV-2, the novel coronavirus associated with COVID-19. This test has been authorized by the FDA under an Emergency Use Authorization (EUA) for use by authorized laboratories. Fact sheet for Healthcare Providers: ConventionUpdate.co.nz  Fact sheet for Patients: ConventionUpdate.co.nz       Methodology: Isothermal Nucleic Acid Amplification               I have reviewed notes of prior 24hr. Other pertinent lab: Total time spent with patient: 28 I personally reviewed chart, notes, data and current medications in the medical record. I have personally examined and treated the patient at bedside during this period.                  Care Plan discussed with: Patient, Nursing Staff and >50% of time spent in counseling and coordination of care    Discussed:  Care Plan    Prophylaxis:  Lovenox    Disposition:  Home w/Family           ___________________________________________________    Attending Physician: Nolon Gowers, MD

## 2021-04-18 NOTE — PROGRESS NOTES
Problem:  Body Temperature -  Risk of, Imbalanced  Goal: Complications related to the disease process, condition or treatment will be avoided or minimized  Outcome: Progressing Towards Goal     Problem: Isolation Precautions - Risk of Spread of Infection  Goal: Prevent transmission of infectious organism to others  Outcome: Progressing Towards Goal

## 2021-04-19 LAB
ALBUMIN SERPL-MCNC: 3 G/DL (ref 3.5–5)
ALBUMIN/GLOB SERPL: 0.7 {RATIO} (ref 1.1–2.2)
ALP SERPL-CCNC: 67 U/L (ref 45–117)
ALT SERPL-CCNC: 53 U/L (ref 12–78)
ANION GAP SERPL CALC-SCNC: 4 MMOL/L (ref 5–15)
AST SERPL-CCNC: 38 U/L (ref 15–37)
ATRIAL RATE: 102 BPM
BILIRUB SERPL-MCNC: 0.4 MG/DL (ref 0.2–1)
BUN SERPL-MCNC: 15 MG/DL (ref 6–20)
BUN/CREAT SERPL: 22 (ref 12–20)
CALCIUM SERPL-MCNC: 8.5 MG/DL (ref 8.5–10.1)
CALCULATED P AXIS, ECG09: 11 DEGREES
CALCULATED R AXIS, ECG10: 14 DEGREES
CALCULATED T AXIS, ECG11: -7 DEGREES
CHLORIDE SERPL-SCNC: 107 MMOL/L (ref 97–108)
CO2 SERPL-SCNC: 27 MMOL/L (ref 21–32)
CREAT SERPL-MCNC: 0.68 MG/DL (ref 0.7–1.3)
CRP SERPL-MCNC: 2.2 MG/DL (ref 0–0.6)
D DIMER PPP FEU-MCNC: 1.21 MG/L FEU (ref 0–0.65)
DIAGNOSIS, 93000: NORMAL
FIBRINOGEN PPP-MCNC: 454 MG/DL (ref 200–475)
GLOBULIN SER CALC-MCNC: 4.1 G/DL (ref 2–4)
GLUCOSE SERPL-MCNC: 122 MG/DL (ref 65–100)
LDH SERPL L TO P-CCNC: 281 U/L (ref 85–241)
P-R INTERVAL, ECG05: 130 MS
POTASSIUM SERPL-SCNC: 4.1 MMOL/L (ref 3.5–5.1)
PROT SERPL-MCNC: 7.1 G/DL (ref 6.4–8.2)
Q-T INTERVAL, ECG07: 366 MS
QRS DURATION, ECG06: 88 MS
QTC CALCULATION (BEZET), ECG08: 477 MS
SODIUM SERPL-SCNC: 138 MMOL/L (ref 136–145)
VENTRICULAR RATE, ECG03: 102 BPM

## 2021-04-19 PROCEDURE — 83615 LACTATE (LD) (LDH) ENZYME: CPT

## 2021-04-19 PROCEDURE — 36415 COLL VENOUS BLD VENIPUNCTURE: CPT

## 2021-04-19 PROCEDURE — 74011000258 HC RX REV CODE- 258: Performed by: INTERNAL MEDICINE

## 2021-04-19 PROCEDURE — 74011250636 HC RX REV CODE- 250/636: Performed by: PHYSICIAN ASSISTANT

## 2021-04-19 PROCEDURE — 85384 FIBRINOGEN ACTIVITY: CPT

## 2021-04-19 PROCEDURE — 85379 FIBRIN DEGRADATION QUANT: CPT

## 2021-04-19 PROCEDURE — 74011000250 HC RX REV CODE- 250: Performed by: INTERNAL MEDICINE

## 2021-04-19 PROCEDURE — 86140 C-REACTIVE PROTEIN: CPT

## 2021-04-19 PROCEDURE — 74011250636 HC RX REV CODE- 250/636: Performed by: INTERNAL MEDICINE

## 2021-04-19 PROCEDURE — 74011250637 HC RX REV CODE- 250/637: Performed by: INTERNAL MEDICINE

## 2021-04-19 PROCEDURE — 94760 N-INVAS EAR/PLS OXIMETRY 1: CPT

## 2021-04-19 PROCEDURE — 80053 COMPREHEN METABOLIC PANEL: CPT

## 2021-04-19 PROCEDURE — 65660000000 HC RM CCU STEPDOWN

## 2021-04-19 RX ADMIN — OXYCODONE HYDROCHLORIDE AND ACETAMINOPHEN 500 MG: 500 TABLET ORAL at 17:01

## 2021-04-19 RX ADMIN — DEXAMETHASONE 6 MG: 6 TABLET ORAL at 07:55

## 2021-04-19 RX ADMIN — Medication 10 ML: at 20:13

## 2021-04-19 RX ADMIN — OXYCODONE HYDROCHLORIDE AND ACETAMINOPHEN 500 MG: 500 TABLET ORAL at 07:55

## 2021-04-19 RX ADMIN — ENOXAPARIN SODIUM 40 MG: 40 INJECTION SUBCUTANEOUS at 11:53

## 2021-04-19 RX ADMIN — Medication 50 MG: at 07:55

## 2021-04-19 RX ADMIN — Medication 10 ML: at 06:01

## 2021-04-19 RX ADMIN — ENOXAPARIN SODIUM 40 MG: 40 INJECTION SUBCUTANEOUS at 02:26

## 2021-04-19 RX ADMIN — REMDESIVIR 100 MG: 100 INJECTION, POWDER, LYOPHILIZED, FOR SOLUTION INTRAVENOUS at 16:59

## 2021-04-19 RX ADMIN — BENZOCAINE AND MENTHOL 1 LOZENGE: 15; 3.6 LOZENGE ORAL at 11:53

## 2021-04-19 NOTE — ROUTINE PROCESS
Verbal and Written shift change report given to Danni(oncoming nurse) by [facility] CHARLEEN DERAS (offgoing nurse). Report included the following information SBAR, MAR and Recent Results.

## 2021-04-19 NOTE — PROGRESS NOTES
Timmy Fayeelsen [facility] [place] [facility]  [address] [facility], [address] [place] [address]  [phone]      Medical Progress Note      NAME: Will East   :  1975  MRM:  [id number]    Date of service: 2021  7:25 AM       Assessment and Plan:   1. Acute respiratory failure with hypoxia - 2/2 COVID PNA. D-dimer negative so lower suspicion for PE. Procalcitonin low so unlikely bacterial PNA. Continue supplement oxygen to keep SAO2 > 90%. Still very SOB with minimal activity      2. COVID-19 pneumonia (2021). Continue decadron 6mg PO daily x 10 days, remdesivir, ascorbic acid, zinc, trend inflammatory markers, incentive spirometry, OOB, prone. Subjective:     Chief Complaint[de-identified] Patient was seen and examined as a follow up for COVID 19 pneumonia. Chart was reviewed. c/o cough and SOB with minimal activity     ROS:  (bold if positive, if negative)    Tolerating PT  Tolerating Diet        Objective:     Last 24hrs VS reviewed since prior progress note.  Most recent are:    Visit Vitals  /82 (BP 1 Location: Right arm, BP Patient Position: At rest)   Pulse 82   Temp 98.1 °F (36.7 °C)   Resp 20   Ht 5' (1.524 m)   Wt 91.4 kg (201 lb 6.4 oz)   SpO2 91%   BMI 39.33 kg/m²     SpO2 Readings from Last 6 Encounters:   21 91%    O2 Flow Rate (L/min): 2 l/min       Intake/Output Summary (Last 24 hours) at 2021 1203  Last data filed at 2021 0900  Gross per 24 hour   Intake 1040 ml   Output 0 ml   Net 1040 ml        Physical Exam:    Gen:  Well-developed, well-nourished, in no acute distress  HEENT:  Pink conjunctivae, PERRL, hearing intact to voice, moist mucous membranes  Neck:  Supple, without masses, thyroid non-tender  Resp:  No accessory muscle use, clear breath sounds without wheezes rales or rhonchi  Card:  No murmurs, normal S1, S2 without thrills, bruits or peripheral edema  Abd:  Soft, non-tender, non-distended, normoactive bowel sounds are present, no palpable organomegaly and no detectable hernias  Lymph:  No cervical or inguinal adenopathy  Musc:  No cyanosis or clubbing  Skin:  No rashes or ulcers, skin turgor is good  Neuro:  Cranial nerves are grossly intact, no focal motor weakness, follows commands appropriately  Psych:  Good insight, oriented to person, place and time, alert  __________________________________________________________________  Medications Reviewed: (see below)  Medications:     Current Facility-Administered Medications   Medication Dose Route Frequency    benzocaine-menthoL (CEPACOL) lozenge 1 Lozenge  1 Lozenge Mucous Membrane PRN    enoxaparin (LOVENOX) injection 40 mg  40 mg SubCUTAneous Q12H    sodium chloride (NS) flush 5-40 mL  5-40 mL IntraVENous Q8H    sodium chloride (NS) flush 5-40 mL  5-40 mL IntraVENous PRN    acetaminophen (TYLENOL) tablet 650 mg  650 mg Oral Q4H PRN    HYDROcodone-acetaminophen (NORCO) 5-325 mg per tablet 1 Tab  1 Tab Oral Q4H PRN    naloxone (NARCAN) injection 0.4 mg  0.4 mg IntraVENous PRN    ondansetron (ZOFRAN) injection 4 mg  4 mg IntraVENous Q4H PRN    ascorbic acid (vitamin C) (VITAMIN C) tablet 500 mg  500 mg Oral BID    zinc gluconate tablet 50 mg  50 mg Oral DAILY    dexAMETHasone (DECADRON) tablet 6 mg  6 mg Oral DAILY    hydrALAZINE (APRESOLINE) 20 mg/mL injection 20 mg  20 mg IntraVENous Q6H PRN    remdesivir 100 mg in 0.9% sodium chloride 250 mL IVPB  100 mg IntraVENous Q24H    benzonatate (TESSALON) capsule 100 mg  100 mg Oral TID PRN        Lab Data Reviewed: (see below)  Lab Review:     Recent Labs     04/17/21  0504 04/16/21  1617   WBC 6.0 6.5   HGB 14.7 16.0   HCT 43.9 46.1    198     Recent Labs     04/19/21  0235 04/18/21  0125 04/17/21  0504    138 138   K 4.1 3.9 3.6    108 106   CO2 27 26 28   * 124* 102*   BUN 15 13 11   CREA 0.68* 0.66* 0.64*   CA 8.5 8.7 7.9*   ALB 3.0* 3.2* 2.9*   TBILI 0.4 0.4 0.3   ALT 53 51 37     No results found for: GLUCPOC  No results for input(s): PH, PCO2, PO2, HCO3, FIO2 in the last 72 hours. No results for input(s): INR, INREXT, INREXT in the last 72 hours. All Micro Results     Procedure Component Value Units Date/Time    COVID-19 RAPID TEST [[id number]]  (Abnormal) Collected: 04/16/21 1617    Order Status: Completed Specimen: Nasopharyngeal Updated: 04/16/21 1640     Specimen source Nasopharyngeal        COVID-19 rapid test Detected        Comment: CALLED TO AND READ BACK BY  Pandora Burkitt RN AT 1640 BY AT  [employer] ID Now       The specimen is POSITIVE for SARS-CoV-2, the novel coronavirus associated with COVID-19. This test has been authorized by the FDA under an Emergency Use Authorization (EUA) for use by authorized laboratories. Fact sheet for Healthcare Providers: ConventionUpdate.co.nz  Fact sheet for Patients: ConventionUpdate.co.nz       Methodology: Isothermal Nucleic Acid Amplification               I have reviewed notes of prior 24hr. Other pertinent lab: Total time spent with patient: 28 I personally reviewed chart, notes, data and current medications in the medical record. I have personally examined and treated the patient at bedside during this period.                  Care Plan discussed with: Patient, Nursing Staff and >50% of time spent in counseling and coordination of care    Discussed:  Care Plan    Prophylaxis:  Lovenox    Disposition:  Home w/Family           ___________________________________________________    Attending Physician: Juani Dang MD

## 2021-04-19 NOTE — PROGRESS NOTES
Bedside shift change report given to Eulogio Gauthier (oncoming nurse) by Bita Woodruff (offgoing nurse). Report included the following information SBAR, Kardex, Intake/Output, MAR and Recent Results.

## 2021-04-19 NOTE — PROGRESS NOTES
Received order for RT-evaluation for home oxygen evaluation. Patient notes does not indicate discharge within the next 24 hours. Will perform text closer to discharge.

## 2021-04-19 NOTE — PROGRESS NOTES
Pulmonary, Critical Care, and Sleep Medicine~Consult Note    Name: Micah Davey MRN: [id number]   : 1975 Hospital: [address]    Date: 2021 12:09 PM Admission: 2021     Impression Plan   1. covid 19 pneumonia  2. Acute hypoxemic respiratory failure 1. cont supp o2 for goal sat>90%; currently on 2L. Will have RT check walking oximetry. 2. cont dex  3. Agree with Remdesivir  4. Trend LFTs, ddimer  5. Increase lovenox to bid   is stable from a pulmonary standpoint. We will sign off and arrange for outpatient follow-up in 2-3 weeks. Please call with questions. Daily Progression:    49 y/o Azeri-speaking M with no PMH presents with shortness of breath, chest pain, dry cough x 5-6 days. In the ED O2 sats 86-87% on RA. afebrile    Labs: wbc 6, ddimer 1.22, pct <0.05, crp 9.09, ldh 285, covid 19 positive    cxr with diffuse patchy asdz      I have reviewed the labs and previous days notes. ROS: He c/o dry cough, dyspnea on exertion and states his chest feels like it might explode. Had chills prior to admission. Sore throat and chest pain which is reproducible. He denies fevers, loss of taste or smell, leg pain/swelling. No known sick contacts. I weaned him from 4L to 2L o2 with O2 sats 94%. Social hx: nonsmoker    Interval history:    Afebrile  BP stable  Sats 92% on 2L NC  D-dimer 1.21; better  LFTs stable  CRP 2.2; trending down    ROS: Reports feeling much better. No current complaints this morning. Coughing less. Denies fever/chills. Sitting up in chair and using IS. No past medical history on file. No past surgical history on file. Prior to Admission medications    Not on File     No Known Allergies   Social History     Tobacco Use    Smoking status: Not on file   Substance Use Topics    Alcohol use: Not on file      No family history on file.   OBJECTIVE:     Vital Signs:       Visit Vitals  /82 (BP 1 Location: Right arm, BP Patient Position: At rest)   Pulse 82   Temp 98.1 °F (36.7 °C)   Resp 20   Ht 5' (1.524 m)   Wt 91.4 kg (201 lb 6.4 oz)   SpO2 91%   BMI 39.33 kg/m²      Temp (24hrs), Av °F (36.7 °C), Min:97.7 °F (36.5 °C), Max:98.1 °F (36.7 °C)     Intake/Output:     Last shift: 701 - 1900  In: 240 [P.O.:240]  Out: -     Last 3 shifts: 1901 -  07  In: 7346 [P.O.:1030;  I.V.:260]  Out: 0           Intake/Output Summary (Last 24 hours) at 2021 1134  Last data filed at 2021 0900  Gross per 24 hour   Intake 1040 ml   Output 0 ml   Net 1040 ml       Physical Exam:                                        Exam Findings Other   General: No resp distress noted, appears stated age    [de-identified]:  No ulcers, moist mucosa    Chest: No pectus deformity, normal chest rise b/l    HEART:  RRR, no murmur    Lungs:  Bibasilar crackles    ABD: Soft/NT    EXT: No cyanosis/clubbing/edema, normal peripheral pulses    Skin: No rashes or ulcers, no mottling    Neuro: A/O x 3        Medications:  Current Facility-Administered Medications   Medication Dose Route Frequency    benzocaine-menthoL (CEPACOL) lozenge 1 Lozenge  1 Lozenge Mucous Membrane PRN    enoxaparin (LOVENOX) injection 40 mg  40 mg SubCUTAneous Q12H    sodium chloride (NS) flush 5-40 mL  5-40 mL IntraVENous Q8H    sodium chloride (NS) flush 5-40 mL  5-40 mL IntraVENous PRN    acetaminophen (TYLENOL) tablet 650 mg  650 mg Oral Q4H PRN    HYDROcodone-acetaminophen (NORCO) 5-325 mg per tablet 1 Tab  1 Tab Oral Q4H PRN    naloxone (NARCAN) injection 0.4 mg  0.4 mg IntraVENous PRN    ondansetron (ZOFRAN) injection 4 mg  4 mg IntraVENous Q4H PRN    ascorbic acid (vitamin C) (VITAMIN C) tablet 500 mg  500 mg Oral BID    zinc gluconate tablet 50 mg  50 mg Oral DAILY    dexAMETHasone (DECADRON) tablet 6 mg  6 mg Oral DAILY    hydrALAZINE (APRESOLINE) 20 mg/mL injection 20 mg  20 mg IntraVENous Q6H PRN    remdesivir 100 mg in 0.9% sodium chloride 250 mL IVPB  100 mg IntraVENous Q24H    benzonatate (TESSALON) capsule 100 mg  100 mg Oral TID PRN       Labs:  ABG No results for input(s): PHI, PCO2I, PO2I, HCO3I, SO2I, FIO2I in the last 72 hours.      CBC Recent Labs     04/17/21  0504 04/16/21  1617   WBC 6.0 6.5   HGB 14.7 16.0   HCT 43.9 46.1    198   MCV 88.7 87.1   MCH 29.7 04.0        Metabolic  Panel Recent Labs     04/19/21  0235 04/18/21  0125 04/17/21  0504    138 138   K 4.1 3.9 3.6    108 106   CO2 27 26 28   * 124* 102*   BUN 15 13 11   CREA 0.68* 0.66* 0.64*   CA 8.5 8.7 7.9*   ALB 3.0* 3.2* 2.9*   ALT 53 51 37        Pertinent Labs                Heydi Mora NP  4/19/2021

## 2021-04-20 LAB
ALBUMIN SERPL-MCNC: 3.1 G/DL (ref 3.5–5)
ALBUMIN/GLOB SERPL: 0.8 {RATIO} (ref 1.1–2.2)
ALP SERPL-CCNC: 70 U/L (ref 45–117)
ALT SERPL-CCNC: 62 U/L (ref 12–78)
ANION GAP SERPL CALC-SCNC: 4 MMOL/L (ref 5–15)
AST SERPL-CCNC: 33 U/L (ref 15–37)
BASOPHILS # BLD: 0 K/UL (ref 0–0.1)
BASOPHILS NFR BLD: 0 % (ref 0–1)
BILIRUB SERPL-MCNC: 0.4 MG/DL (ref 0.2–1)
BUN SERPL-MCNC: 15 MG/DL (ref 6–20)
BUN/CREAT SERPL: 27 (ref 12–20)
CALCIUM SERPL-MCNC: 8.6 MG/DL (ref 8.5–10.1)
CHLORIDE SERPL-SCNC: 107 MMOL/L (ref 97–108)
CO2 SERPL-SCNC: 27 MMOL/L (ref 21–32)
COMMENT, HOLDF: NORMAL
CREAT SERPL-MCNC: 0.55 MG/DL (ref 0.7–1.3)
CRP SERPL-MCNC: 0.99 MG/DL (ref 0–0.6)
D DIMER PPP FEU-MCNC: 0.74 MG/L FEU (ref 0–0.65)
DIFFERENTIAL METHOD BLD: ABNORMAL
EOSINOPHIL # BLD: 0 K/UL (ref 0–0.4)
EOSINOPHIL NFR BLD: 0 % (ref 0–7)
ERYTHROCYTE [DISTWIDTH] IN BLOOD BY AUTOMATED COUNT: 12.2 % (ref 11.5–14.5)
GLOBULIN SER CALC-MCNC: 3.8 G/DL (ref 2–4)
GLUCOSE SERPL-MCNC: 96 MG/DL (ref 65–100)
HCT VFR BLD AUTO: 43.5 % (ref 36.6–50.3)
HGB BLD-MCNC: 14.9 G/DL (ref 12.1–17)
IMM GRANULOCYTES # BLD AUTO: 0.1 K/UL (ref 0–0.04)
IMM GRANULOCYTES NFR BLD AUTO: 1 % (ref 0–0.5)
LYMPHOCYTES # BLD: 1.9 K/UL (ref 0.8–3.5)
LYMPHOCYTES NFR BLD: 18 % (ref 12–49)
M PNEUMO IGG SER IA-ACNC: 381 U/ML (ref 0–99)
M PNEUMO IGM SER IA-ACNC: <770 U/ML (ref 0–769)
MAGNESIUM SERPL-MCNC: 2.4 MG/DL (ref 1.6–2.4)
MCH RBC QN AUTO: 30 PG (ref 26–34)
MCHC RBC AUTO-ENTMCNC: 34.3 G/DL (ref 30–36.5)
MCV RBC AUTO: 87.7 FL (ref 80–99)
MONOCYTES # BLD: 0.7 K/UL (ref 0–1)
MONOCYTES NFR BLD: 7 % (ref 5–13)
NEUTS SEG # BLD: 8.1 K/UL (ref 1.8–8)
NEUTS SEG NFR BLD: 74 % (ref 32–75)
NRBC # BLD: 0 K/UL (ref 0–0.01)
NRBC BLD-RTO: 0 PER 100 WBC
PHOSPHATE SERPL-MCNC: 3.1 MG/DL (ref 2.6–4.7)
PLATELET # BLD AUTO: 374 K/UL (ref 150–400)
PMV BLD AUTO: 10.1 FL (ref 8.9–12.9)
POTASSIUM SERPL-SCNC: 3.8 MMOL/L (ref 3.5–5.1)
PROT SERPL-MCNC: 6.9 G/DL (ref 6.4–8.2)
RBC # BLD AUTO: 4.96 M/UL (ref 4.1–5.7)
SAMPLES BEING HELD,HOLD: NORMAL
SODIUM SERPL-SCNC: 138 MMOL/L (ref 136–145)
WBC # BLD AUTO: 10.9 K/UL (ref 4.1–11.1)

## 2021-04-20 PROCEDURE — 65660000000 HC RM CCU STEPDOWN

## 2021-04-20 PROCEDURE — 74011250637 HC RX REV CODE- 250/637: Performed by: INTERNAL MEDICINE

## 2021-04-20 PROCEDURE — 74011000250 HC RX REV CODE- 250: Performed by: INTERNAL MEDICINE

## 2021-04-20 PROCEDURE — 80053 COMPREHEN METABOLIC PANEL: CPT

## 2021-04-20 PROCEDURE — 94640 AIRWAY INHALATION TREATMENT: CPT

## 2021-04-20 PROCEDURE — 36415 COLL VENOUS BLD VENIPUNCTURE: CPT

## 2021-04-20 PROCEDURE — 94618 PULMONARY STRESS TESTING: CPT

## 2021-04-20 PROCEDURE — 85025 COMPLETE CBC W/AUTO DIFF WBC: CPT

## 2021-04-20 PROCEDURE — 85379 FIBRIN DEGRADATION QUANT: CPT

## 2021-04-20 PROCEDURE — 74011250636 HC RX REV CODE- 250/636: Performed by: INTERNAL MEDICINE

## 2021-04-20 PROCEDURE — 77010033678 HC OXYGEN DAILY

## 2021-04-20 PROCEDURE — 84100 ASSAY OF PHOSPHORUS: CPT

## 2021-04-20 PROCEDURE — 94760 N-INVAS EAR/PLS OXIMETRY 1: CPT

## 2021-04-20 PROCEDURE — 83735 ASSAY OF MAGNESIUM: CPT

## 2021-04-20 PROCEDURE — 74011000258 HC RX REV CODE- 258: Performed by: INTERNAL MEDICINE

## 2021-04-20 PROCEDURE — 74011250636 HC RX REV CODE- 250/636: Performed by: PHYSICIAN ASSISTANT

## 2021-04-20 PROCEDURE — 86140 C-REACTIVE PROTEIN: CPT

## 2021-04-20 RX ORDER — ZOLPIDEM TARTRATE 5 MG/1
5 TABLET ORAL
Status: DISCONTINUED | OUTPATIENT
Start: 2021-04-20 | End: 2021-04-21 | Stop reason: HOSPADM

## 2021-04-20 RX ORDER — GUAIFENESIN 600 MG/1
600 TABLET, EXTENDED RELEASE ORAL EVERY 12 HOURS
Status: DISCONTINUED | OUTPATIENT
Start: 2021-04-20 | End: 2021-04-21 | Stop reason: HOSPADM

## 2021-04-20 RX ADMIN — OXYCODONE HYDROCHLORIDE AND ACETAMINOPHEN 500 MG: 500 TABLET ORAL at 08:34

## 2021-04-20 RX ADMIN — Medication 10 ML: at 13:21

## 2021-04-20 RX ADMIN — ENOXAPARIN SODIUM 40 MG: 40 INJECTION SUBCUTANEOUS at 00:07

## 2021-04-20 RX ADMIN — BENZONATATE 100 MG: 100 CAPSULE ORAL at 03:04

## 2021-04-20 RX ADMIN — REMDESIVIR 100 MG: 100 INJECTION, POWDER, LYOPHILIZED, FOR SOLUTION INTRAVENOUS at 17:19

## 2021-04-20 RX ADMIN — Medication 10 ML: at 05:18

## 2021-04-20 RX ADMIN — Medication 50 MG: at 08:34

## 2021-04-20 RX ADMIN — OXYCODONE HYDROCHLORIDE AND ACETAMINOPHEN 500 MG: 500 TABLET ORAL at 17:19

## 2021-04-20 RX ADMIN — GUAIFENESIN 600 MG: 600 TABLET ORAL at 20:28

## 2021-04-20 RX ADMIN — Medication 10 ML: at 20:30

## 2021-04-20 RX ADMIN — HYDROCODONE BITARTRATE AND ACETAMINOPHEN 1 TABLET: 5; 325 TABLET ORAL at 20:38

## 2021-04-20 RX ADMIN — ZOLPIDEM TARTRATE 5 MG: 5 TABLET ORAL at 23:01

## 2021-04-20 RX ADMIN — IPRATROPIUM BROMIDE AND ALBUTEROL 1 PUFF: 20; 100 SPRAY, METERED RESPIRATORY (INHALATION) at 18:39

## 2021-04-20 RX ADMIN — IPRATROPIUM BROMIDE AND ALBUTEROL 1 PUFF: 20; 100 SPRAY, METERED RESPIRATORY (INHALATION) at 15:10

## 2021-04-20 RX ADMIN — ENOXAPARIN SODIUM 40 MG: 40 INJECTION SUBCUTANEOUS at 23:01

## 2021-04-20 RX ADMIN — ENOXAPARIN SODIUM 40 MG: 40 INJECTION SUBCUTANEOUS at 13:21

## 2021-04-20 RX ADMIN — DEXAMETHASONE 6 MG: 6 TABLET ORAL at 08:34

## 2021-04-20 RX ADMIN — GUAIFENESIN 600 MG: 600 TABLET ORAL at 13:21

## 2021-04-20 NOTE — PROGRESS NOTES
Bedside shift change report given to Kwesi Coles RN (oncoming nurse) by Mordechai Duane, RN (offgoing nurse). Report included the following information SBAR, Kardex, Intake/Output, MAR and Recent Results.

## 2021-04-20 NOTE — PROGRESS NOTES
04/20/21 1204 04/20/21 1206 04/20/21 1208   RT Walking Oximetry   Stage Resting (Room Air) During Walk (Room Air) During Walk (on O2)   SpO2 95 % 89 % (!) 88 %   HR 93 bpm 102 bpm 108 bpm   Rate of Dyspnea 0 0 3   Symptoms  --   --  Shortness of Breath   O2 Device None (Room air) None (Room air) Nasal cannula   O2 Flow Rate (L/min)  --   --  3 l/min   FIO2 (%) 21 % 21 %  --    Walk/Assistive Device  --  Ambulation Ambulation      04/20/21 1210   RT Walking Oximetry   Stage After Walk   SpO2 91 %   HR 85 bpm   Rate of Dyspnea 2   Symptoms Shortness of Breath   O2 Device Nasal cannula   O2 Flow Rate (L/min) 3 l/min   FIO2 (%)  --    Walk/Assistive Device  --

## 2021-04-20 NOTE — PROGRESS NOTES
Provided  services remotely to [facility].               Fadumo Se[employer]  [phone]

## 2021-04-20 NOTE — PROGRESS NOTES
Timmy Joseph [facility] 79  [address]  [phone]      Medical Progress Note      NAME: Winnie Sidhu   :  1975  MRM:  [id number]    Date/Time of service: 2021  12:45 PM       Subjective:     Chief Complaint:  Patient was personally seen and examined by me during this time period. Chart reviewed. Still dyspneic with ambulation. Requiring 3-4L O2       Objective:       Vitals:       Last 24hrs VS reviewed since prior progress note. Most recent are:    Visit Vitals  /77 (BP 1 Location: Right upper arm, BP Patient Position: At rest)   Pulse 75   Temp 97.5 °F (36.4 °C)   Resp 18   Ht 5' (1.524 m)   Wt 91.4 kg (201 lb 6.4 oz)   SpO2 97%   BMI 39.33 kg/m²     SpO2 Readings from Last 6 Encounters:   21 97%    O2 Flow Rate (L/min): 3 l/min       Intake/Output Summary (Last 24 hours) at 2021 1245  Last data filed at 2021 0303  Gross per 24 hour   Intake 720 ml   Output    Net 720 ml        Exam:     Physical Exam:    Gen:  Well-developed, well-nourished, in mild distress  HEENT:  Pink conjunctivae, PERRL, hearing intact to voice, moist mucous membranes  Neck:  Supple, without masses, thyroid non-tender  Resp:  No accessory muscle use, scattered rhonchi   Card:  No murmurs, normal S1, S2 without thrills, bruits or peripheral edema  Abd:  Soft, non-tender, non-distended, normoactive bowel sounds are present  Musc:  No cyanosis or clubbing  Skin:  No rashes  Neuro:  Cranial nerves 3-12 are grossly intact, follows commands appropriately  Psych:  fair insight, oriented to person, place and time, alert.   Maori speaking only    Medications Reviewed: (see below)    Lab Data Reviewed: (see below)    ______________________________________________________________________    Medications:     Current Facility-Administered Medications   Medication Dose Route Frequency    guaiFENesin ER (MUCINEX) tablet 600 mg  600 mg Oral Q12H    ipratropium-albuterol (COMBIVENT RESPIMAT) 20 mcg-100 mcg inhalation spray  1 Puff Inhalation Q6H RT    benzocaine-menthoL (CEPACOL) lozenge 1 Lozenge  1 Lozenge Mucous Membrane PRN    enoxaparin (LOVENOX) injection 40 mg  40 mg SubCUTAneous Q12H    sodium chloride (NS) flush 5-40 mL  5-40 mL IntraVENous Q8H    sodium chloride (NS) flush 5-40 mL  5-40 mL IntraVENous PRN    acetaminophen (TYLENOL) tablet 650 mg  650 mg Oral Q4H PRN    HYDROcodone-acetaminophen (NORCO) 5-325 mg per tablet 1 Tab  1 Tab Oral Q4H PRN    naloxone (NARCAN) injection 0.4 mg  0.4 mg IntraVENous PRN    ondansetron (ZOFRAN) injection 4 mg  4 mg IntraVENous Q4H PRN    ascorbic acid (vitamin C) (VITAMIN C) tablet 500 mg  500 mg Oral BID    zinc gluconate tablet 50 mg  50 mg Oral DAILY    dexAMETHasone (DECADRON) tablet 6 mg  6 mg Oral DAILY    hydrALAZINE (APRESOLINE) 20 mg/mL injection 20 mg  20 mg IntraVENous Q6H PRN    remdesivir 100 mg in 0.9% sodium chloride 250 mL IVPB  100 mg IntraVENous Q24H    benzonatate (TESSALON) capsule 100 mg  100 mg Oral TID PRN          Lab Review:     Recent Labs     04/20/21  0457   WBC 10.9   HGB 14.9   HCT 43.5        Recent Labs     04/20/21  0457 04/19/21  0235 04/18/21  0125    138 138   K 3.8 4.1 3.9    107 108   CO2 27 27 26   GLU 96 122* 124*   BUN 15 15 13   CREA 0.55* 0.68* 0.66*   CA 8.6 8.5 8.7   MG 2.4  --   --    PHOS 3.1  --   --    ALB 3.1* 3.0* 3.2*   TBILI 0.4 0.4 0.4   ALT 62 53 51     No results found for: GLUCPOC       Assessment / Plan:     47 yo otherwise healthy, presented w/ hypoxia, COVID pneumonia    1) Acute resp failure/hypoxia: due to COVID. Still requiring 3-4L O2. Will cont to wean O2, incentive spirometry, nebs. Will likely need 3L O2 at home    2) Pneumonia due to COVID-19: no evidence of bacterial PNA due to low procalcitonin. Will cont IV remdesivir, dexamethasone, Vit C/Zinc.  Monitor inflammatory markers.   Pulm following    Total time spent with patient: 35 min **I personally saw and examined the patient during this time period**                 Care Plan discussed with: Patient, nursing     Discussed:  Care Plan    Prophylaxis:  Lovenox    Disposition:  Home w/Family           ___________________________________________________    Attending Physician: Ubaldo Cardoza MD

## 2021-04-20 NOTE — PROGRESS NOTES
Bedside and Verbal shift change report given to Hai Erwin RN (oncoming nurse) by Yo Dewitt RN (offgoing nurse). Report included the following information SBAR, Kardex, Intake/Output, MAR and Recent Results.

## 2021-04-20 NOTE — PROGRESS NOTES
4/20/2021   CARE MANAGEMENT NOTE:    Reason for Admission:  COVID, PNA, respiratory failure with hypoxia                     RUR Score:   10%; Low risk                  Plan for utilizing home health:   No       PCP: First and Last name:  None     Name of Practice:    Are you a current patient: Yes/No:    Approximate date of last visit:    Can you participate in a virtual visit with your PCP:                     Current Advanced Directive/Advance Care Plan: Full Code      Healthcare Decision Maker:   Click here to complete [address] including selection of the Healthcare Decision Maker Relationship (ie \"Primary\")    Pt is Maori speaking and dx of Covid. [facility] provided remote  services today to obtain hx. Transition of Care Plan:    1. Order received from MD for home oxygen 3 LPM via NC continuously, accessories, and portable. Per RT note today, pt was 95% on room air and 89% - 88% with ambulating. 2.  Pt is uninsured. CM completed a request for assistance thru the [facility] and await approval.  3.  Pt reports that he will arrange transportation from his son for pickup on Wed 4/21 prior to 5 p.m. in order for home oxygen set up. CM will continue to follow pt for home oxygen arrangements.   Lida

## 2021-04-21 VITALS
SYSTOLIC BLOOD PRESSURE: 119 MMHG | HEIGHT: 60 IN | OXYGEN SATURATION: 97 % | DIASTOLIC BLOOD PRESSURE: 75 MMHG | RESPIRATION RATE: 18 BRPM | TEMPERATURE: 97.5 F | BODY MASS INDEX: 39.54 KG/M2 | WEIGHT: 201.4 LBS | HEART RATE: 87 BPM

## 2021-04-21 PROBLEM — J12.82 PNEUMONIA DUE TO COVID-19 VIRUS: Status: ACTIVE | Noted: 2021-04-16

## 2021-04-21 PROBLEM — J96.01 ACUTE RESPIRATORY FAILURE WITH HYPOXIA (HCC): Status: ACTIVE | Noted: 2021-04-21

## 2021-04-21 LAB
ANION GAP SERPL CALC-SCNC: 4 MMOL/L (ref 5–15)
BUN SERPL-MCNC: 15 MG/DL (ref 6–20)
BUN/CREAT SERPL: 24 (ref 12–20)
CALCIUM SERPL-MCNC: 8.6 MG/DL (ref 8.5–10.1)
CHLORIDE SERPL-SCNC: 107 MMOL/L (ref 97–108)
CO2 SERPL-SCNC: 26 MMOL/L (ref 21–32)
CREAT SERPL-MCNC: 0.62 MG/DL (ref 0.7–1.3)
D DIMER PPP FEU-MCNC: 0.57 MG/L FEU (ref 0–0.65)
ERYTHROCYTE [DISTWIDTH] IN BLOOD BY AUTOMATED COUNT: 12 % (ref 11.5–14.5)
GLUCOSE SERPL-MCNC: 100 MG/DL (ref 65–100)
HCT VFR BLD AUTO: 44.6 % (ref 36.6–50.3)
HGB BLD-MCNC: 14.9 G/DL (ref 12.1–17)
MAGNESIUM SERPL-MCNC: 2.5 MG/DL (ref 1.6–2.4)
MCH RBC QN AUTO: 29.7 PG (ref 26–34)
MCHC RBC AUTO-ENTMCNC: 33.4 G/DL (ref 30–36.5)
MCV RBC AUTO: 88.8 FL (ref 80–99)
NRBC # BLD: 0 K/UL (ref 0–0.01)
NRBC BLD-RTO: 0 PER 100 WBC
PHOSPHATE SERPL-MCNC: 3.6 MG/DL (ref 2.6–4.7)
PLATELET # BLD AUTO: 383 K/UL (ref 150–400)
PMV BLD AUTO: 10 FL (ref 8.9–12.9)
POTASSIUM SERPL-SCNC: 4.1 MMOL/L (ref 3.5–5.1)
PROCALCITONIN SERPL-MCNC: <0.05 NG/ML
RBC # BLD AUTO: 5.02 M/UL (ref 4.1–5.7)
SODIUM SERPL-SCNC: 137 MMOL/L (ref 136–145)
WBC # BLD AUTO: 10.2 K/UL (ref 4.1–11.1)

## 2021-04-21 PROCEDURE — 85379 FIBRIN DEGRADATION QUANT: CPT

## 2021-04-21 PROCEDURE — 36415 COLL VENOUS BLD VENIPUNCTURE: CPT

## 2021-04-21 PROCEDURE — 77010033678 HC OXYGEN DAILY

## 2021-04-21 PROCEDURE — 74011250636 HC RX REV CODE- 250/636: Performed by: PHYSICIAN ASSISTANT

## 2021-04-21 PROCEDURE — 74011250637 HC RX REV CODE- 250/637: Performed by: INTERNAL MEDICINE

## 2021-04-21 PROCEDURE — 74011250636 HC RX REV CODE- 250/636: Performed by: INTERNAL MEDICINE

## 2021-04-21 PROCEDURE — 84100 ASSAY OF PHOSPHORUS: CPT

## 2021-04-21 PROCEDURE — 84145 PROCALCITONIN (PCT): CPT

## 2021-04-21 PROCEDURE — 94640 AIRWAY INHALATION TREATMENT: CPT

## 2021-04-21 PROCEDURE — 94760 N-INVAS EAR/PLS OXIMETRY 1: CPT

## 2021-04-21 PROCEDURE — 80048 BASIC METABOLIC PNL TOTAL CA: CPT

## 2021-04-21 PROCEDURE — 85027 COMPLETE CBC AUTOMATED: CPT

## 2021-04-21 PROCEDURE — 94664 DEMO&/EVAL PT USE INHALER: CPT

## 2021-04-21 PROCEDURE — 83735 ASSAY OF MAGNESIUM: CPT

## 2021-04-21 RX ORDER — BENZONATATE 100 MG/1
100 CAPSULE ORAL
Qty: 30 CAP | Refills: 0 | Status: SHIPPED | OUTPATIENT
Start: 2021-04-21 | End: 2021-04-28

## 2021-04-21 RX ORDER — ZINC GLUCONATE 50 MG
50 TABLET ORAL DAILY
Qty: 30 TAB | Refills: 0 | Status: SHIPPED | OUTPATIENT
Start: 2021-04-22

## 2021-04-21 RX ORDER — DEXAMETHASONE 6 MG/1
6 TABLET ORAL
Qty: 4 TAB | Refills: 0 | Status: SHIPPED | OUTPATIENT
Start: 2021-04-21

## 2021-04-21 RX ORDER — GUAIFENESIN 600 MG/1
600 TABLET, EXTENDED RELEASE ORAL EVERY 12 HOURS
Qty: 30 TAB | Refills: 0 | Status: SHIPPED | OUTPATIENT
Start: 2021-04-21

## 2021-04-21 RX ORDER — ASCORBIC ACID 500 MG
500 TABLET ORAL DAILY
Qty: 30 TAB | Refills: 0 | Status: SHIPPED | OUTPATIENT
Start: 2021-04-21 | End: 2021-05-21

## 2021-04-21 RX ORDER — ALBUTEROL SULFATE 90 UG/1
2 AEROSOL, METERED RESPIRATORY (INHALATION)
Qty: 1 INHALER | Refills: 1 | Status: SHIPPED | OUTPATIENT
Start: 2021-04-21

## 2021-04-21 RX ADMIN — IPRATROPIUM BROMIDE AND ALBUTEROL 1 PUFF: 20; 100 SPRAY, METERED RESPIRATORY (INHALATION) at 15:41

## 2021-04-21 RX ADMIN — Medication 50 MG: at 08:57

## 2021-04-21 RX ADMIN — IPRATROPIUM BROMIDE AND ALBUTEROL 1 PUFF: 20; 100 SPRAY, METERED RESPIRATORY (INHALATION) at 17:51

## 2021-04-21 RX ADMIN — OXYCODONE HYDROCHLORIDE AND ACETAMINOPHEN 500 MG: 500 TABLET ORAL at 08:57

## 2021-04-21 RX ADMIN — Medication 10 ML: at 03:14

## 2021-04-21 RX ADMIN — OXYCODONE HYDROCHLORIDE AND ACETAMINOPHEN 500 MG: 500 TABLET ORAL at 17:12

## 2021-04-21 RX ADMIN — DEXAMETHASONE 6 MG: 6 TABLET ORAL at 08:57

## 2021-04-21 RX ADMIN — IPRATROPIUM BROMIDE AND ALBUTEROL 1 PUFF: 20; 100 SPRAY, METERED RESPIRATORY (INHALATION) at 07:49

## 2021-04-21 RX ADMIN — GUAIFENESIN 600 MG: 600 TABLET ORAL at 08:57

## 2021-04-21 RX ADMIN — ENOXAPARIN SODIUM 40 MG: 40 INJECTION SUBCUTANEOUS at 12:45

## 2021-04-21 RX ADMIN — IPRATROPIUM BROMIDE AND ALBUTEROL 1 PUFF: 20; 100 SPRAY, METERED RESPIRATORY (INHALATION) at 02:29

## 2021-04-21 NOTE — PROGRESS NOTES
Pharmacist Discharge Medication Reconciliation    Discharge Provider:  Dr. Mignon Plasencia       Discharge Medications:      My Medications        START taking these medications        Instructions Each Dose to Equal Morning Noon Evening Bedtime   albuterol 90 mcg/actuation inhaler  Commonly known as: PROVENTIL HFA, VENTOLIN HFA, PROAIR HFA      Take 2 Puffs by inhalation every six (6) hours as needed for Wheezing or Shortness of Breath. 2 Puff         apixaban 2.5 mg tablet  Commonly known as: Eliquis      Take 1 Tab by mouth two (2) times a day. 2.5 mg         ascorbic acid (vitamin C) 500 mg tablet  Commonly known as: VITAMIN C      Take 1 Tab by mouth daily for 30 days. 500 mg         benzonatate 100 mg capsule  Commonly known as: TESSALON      Take 1 Cap by mouth three (3) times daily as needed for Cough for up to 7 days. 100 mg         dexAMETHasone 6 mg tablet  Commonly known as: DECADRON      Take 1 Tab by mouth Daily (before breakfast). 6 mg         guaiFENesin  mg ER tablet  Commonly known as: MUCINEX      Take 1 Tab by mouth every twelve (12) hours. 600 mg         zinc gluconate 50 mg tablet  Start taking on: April 22, 2021      Take 1 Tab by mouth daily. 50 mg                   Where to Get Your Medications        Information on where to get these meds will be given to you by the nurse or doctor.     Ask your nurse or doctor about these medications  albuterol 90 mcg/actuation inhaler  apixaban 2.5 mg tablet  ascorbic acid (vitamin C) 500 mg tablet  benzonatate 100 mg capsule  dexAMETHasone 6 mg tablet  guaiFENesin  mg ER tablet  zinc gluconate 50 mg tablet        The patient's chart, MAR, and AVS were reviewed by   Nixon Coleman, Junaid Barrera,   Contact: [phone]

## 2021-04-21 NOTE — DISCHARGE SUMMARY
Timmy Joseph [facility]  [address] [facility], [place], [address] [place] [address]  [phone]    Physician Discharge Summary     Patient ID:  Lionel Garcia  [id number]  48 y.o.  1975    Admit date: 4/16/2021    Discharge date and time: 4/21/2021 11:05 AM    Admission Diagnoses: Hypoxia [R09.02]    Discharge Diagnoses:  Principal Diagnosis Pneumonia due to COVID-19 virus                                            Principal Problem:    Pneumonia due to COVID-19 virus (4/16/2021)    Active Problems:    Hypoxia (4/16/2021)      SIRS (systemic inflammatory response syndrome) ([facility] [address] (4/16/2021)      Acute respiratory failure with hypoxia ([facility] [address] (4/21/2021)           Hospital Course:     49 yo otherwise healthy, presented w/ hypoxia, COVID pneumonia     1) Acute resp failure/hypoxia: due to [place]. Still requiring 3-4L O2.  CM to set up home O2 prior to discharge     2) Pneumonia due to COVID-19: no evidence of bacterial PNA due to low procalcitonin. s/p course of IV remdesivir, dexamethasone, Vit C/Zinc.  Will complete a course of dexamethasone, Eliquis. Pulm was following    PCP: None     Consults: Pulmonary/Intensive care    Significant Diagnostic Studies: none    Discharge Exam:  Physical Exam:    Gen: Well-developed, well-nourished, in no acute distress  HEENT:  Pink conjunctivae, PERRL, hearing intact to voice, moist mucous membranes  Neck: Supple, without masses, thyroid non-tender  Resp: No accessory muscle use, clear breath sounds without wheezes rales or rhonchi  Card: No murmurs, normal S1, S2 without thrills, bruits or peripheral edema  Abd:  Soft, non-tender, non-distended, normoactive bowel sounds are present  Lymph:  No cervical or inguinal adenopathy  Musc: No cyanosis or clubbing  Skin: No rashes or ulcers  Neuro:  Cranial nerves are grossly intact, no focal motor weakness, follows commands appropriately  Psych:  Good insight, oriented to person, place and time, alert.   Haitian speaking only     Disposition: home  Discharge Condition: Stable    Patient Instructions:   Current Discharge Medication List      START taking these medications    Details   ascorbic acid, vitamin C, (VITAMIN C) 500 mg tablet Take 1 Tab by mouth daily for 30 days. Qty: 30 Tab, Refills: 0      benzonatate (TESSALON) 100 mg capsule Take 1 Cap by mouth three (3) times daily as needed for Cough for up to 7 days. Qty: 30 Cap, Refills: 0      dexAMETHasone (DECADRON) 6 mg tablet Take 1 Tab by mouth Daily (before breakfast). Qty: 4 Tab, Refills: 0      guaiFENesin ER (MUCINEX) 600 mg ER tablet Take 1 Tab by mouth every twelve (12) hours. Qty: 30 Tab, Refills: 0      zinc gluconate 50 mg tablet Take 1 Tab by mouth daily. Qty: 30 Tab, Refills: 0      apixaban (Eliquis) 2.5 mg tablet Take 1 Tab by mouth two (2) times a day. Qty: 30 Tab, Refills: 0      albuterol (PROVENTIL HFA, VENTOLIN HFA, PROAIR HFA) 90 mcg/actuation inhaler Take 2 Puffs by inhalation every six (6) hours as needed for Wheezing or Shortness of Breath.   Qty: 1 Inhaler, Refills: 1           Activity: no working until you're cleared by your primary care doctor   Diet: Regular Diet  Wound Care: None needed    Follow-up with  Follow-up Information     Follow up With Specialties Details Why [address] [place] [address],[address]  Schedule an appointment as soon as possible for a visit in 2 weeks  [place] [address]  [phone]          Follow-up tests/labs none    Signed:  Panchito Alcantara MD  4/21/2021  11:05 AM  **I personally spent 35 min on discharge**

## 2021-04-21 NOTE — PROGRESS NOTES
Pt and son educated on discharge instructions, prescriptions and follow-up appointments. Pt verbalized understanding.

## 2021-04-21 NOTE — PROGRESS NOTES
4/21/2021   CARE MANAGEMENT NOTE:  CM reviewed EMR for clinical updates. Pt was admitted with COVID, PNA, and respiratory failure. Reportedly, pt and his son reside in [place] (address confirmed on face sheet). RUR 10%    Transition Plan of Care:  1. Pt will return to his home   2. [facility] was submitted for three month payment of home oxygen at 3 LPM and it was approved. 3.  Referral was faxed to [facility] who will set up home concentrator after 5 p.m. tonight. A portable will be delivered to pt's hospital room this afternoon. 4.  Eliquis coupon for 30 free trial and a Good RX card was provided to pt (pt is uninsured)  5. Pt's son will provide transportation today after 5 p.m. CM will continue to follow pt until discharged.   Lida

## 2021-04-21 NOTE — DISCHARGE INSTRUCTIONS
HOSPITALIST DISCHARGE INSTRUCTIONS  NAME: Randell Porter   :  1975   MRN:  [id number]     Date/Time:  2021 11:04 AM    ADMIT DATE: 2021     DISCHARGE DATE: 2021     ADMITTING DIAGNOSIS:  Pneumonia from COVID-19    DISCHARGE DIAGNOSIS:  same    MEDICATIONS:  See after visit summary       · It is important that you take the medication exactly as they are prescribed. · Keep your medication in the bottles provided by the pharmacist and keep a list of the medication names, dosages, and times to be taken in your wallet. · Do not take other medications without consulting your doctor     Pain Management: per above medications    What to do at Home    Recommended diet:  Regular Diet    Recommended activity: Do not work until you're cleared by a primary care doctor     1) Return to the hospital if you feel worse    2) If you experience any of the following symptoms then please call your primary care physician or return to the emergency room if you cannot get hold of your doctor:  Fever, chills, nausea, vomiting, diarrhea, change in mentation, falling, bleeding, shortness of breath, chest pain, severe headache, severe abdominal pain,     3) Practice social distancing and wears masks    4) Do not work while your are requiring Oxygen. See your primary care doctor first before returning to work    Follow Up: Follow-up Information     Follow up With Specialties Details Why [address] [place] [address],[address]  Schedule an appointment as soon as possible for a visit in 2 weeks  Panchito Obrien   [phone]            Information obtained by :  I understand that if any problems occur once I am at home I am to contact my physician. I understand and acknowledge receipt of the instructions indicated above. Physician's or R.N.'s Signature                                                                  Date/Time                                                                                                                                              Patient or Representative Signature                                                          Date/Time    Patient Education        Aprenda sobre el coronavirus (UTRTP-08)  Learning About Coronavirus (COVID-19)  ¿Qué es el coronavirus (COVID-19)? COVID-19 es ana laura enfermedad causada por un nuevo tipo de coronavirus. Esta enfermedad se descubrió por primera vez en diciembre de 2019. Desde entonces, se ha propagado por todo [employer]. Los coronavirus son un gran milady de virus. Causan el resfriado. También causan enfermedades más graves anil el síndrome respiratorio de [place] (MERS, por tiffanie siglas en inglés) y el síndrome respiratorio nicolasa grave (SARS, por tiffanie siglas en inglés). La causa de COVID-19 es un nuevo coronavirus. Eso significa que es un nuevo tipo que no se ha visto antes [facility]. ¿Cuáles son los síntomas? Los síntomas del coronavirus (COVID-19) pueden incluir:  · Mk. · Tos. · Problemas para respirar. · Escalofríos o temblores con escalofríos que no ceden. · Dolor muscular. · Dolor de jony. · Dolor de garganta. · Pérdida reciente del gusto o el olfato. · Vómitos. · Diarrea. En casos graves, COVID-19 puede causar neumonía y hacer que sea difícil respirar sin ayuda de un aparato. Puede causar la muerte. ¿Cómo se diagnostica? COVID-19 se diagnostica mediante ana laura prueba viral. Esta también puede llamarse prueba de PCR o prueba de antígenos. Esta busca evidencia del virus en las vías respiratorias o los pulmones (aparato respiratorio). La prueba se realiza con mayor frecuencia usando ana laura muestra de la nariz, la garganta o los pulmones. A veces se hace usando ana laura muestra de saliva.  Marbin Wakefield de obtener ana laura [place] es introduciendo un hisopo roseann en la parte posterior de la nariz. ¿Cómo se trata? Los casos leves de COVID-19 se pueden tratar en el hogar. Los  graves requieren [employer]. El tratamiento puede incluir medicamentos para [employer] síntomas, además de asistencia respiratoria, anil oxigenoterapia o un respirador. Es posible que se coloque boca abajo a algunas personas para mejorar tiffanie niveles de oxígeno. Los tratamientos que pueden ayudar a las personas que tienen COVID-19 incluyen:  Medicamentos antivirales. Estos medicamentos tratan las infecciones virales. Remdesivir es un ejemplo. Inmunoterapia. Estos medicamentos ayudan al sistema inmunitario a combatir el COVID-19. Un ejemplo es bamlanivimab. Es un anticuerpo monoclonal.   Anticoagulantes. Estos medicamentos se utilizan para prevenir los coágulos de [place]. Las personas con enfermedad grave corren el riesgo de que se formen coágulos sanguíneos. ¿Cómo puede cuidarse usted y proteger a los demás? La mejor manera de evitar enfermarse es hacer lo siguiente:  · Evitar las áreas en las que haya un brote. · Evitar el contacto con personas que pudieran estar infectadas. · [address] [facility] y tratar de mantenerse al menos a 6 pies de distancia de otras personas. · [address] [facility] leonel con frecuencia, especialmente después de toser o estornudar. Use agua y Tor Hashimoto y fróteselas salvador al menos 20 segundos. Si no hay agua y jabón disponibles, use un gel desinfectante para leonel a base de alcohol. · Evitar tocarse los ojos, la nariz y la boca. Para ayudar a evitar la transmisión del virus a otras personas:  · Quédese en casa si está enfermo o ha estado expuesto al virus. No vaya a la escuela, al Janace Porteous ni a lugares públicos. Y no use el transporte público, transportes compartidos ni taxis a menos que no tenga otra opción. · Use ana laura cubierta facial de shaylee si tiene que ir a áreas públicas.   · Cúbrase la boca con un pañuelo de papel cuando tosa o estornude. Luego tire el pañuelo a la basura y Ayo leonel inmediatamente. · Si está enfermo:  ? Salga de casa solo si necesita [employer]. Maryana llame ilan al consultorio médico para que sepan que va a venir. Y use ana laura cubierta facial.  ? Póngase la cubierta facial siempre que esté cerca de otras personas. [place] puede ayudar a detener la propagación del virus. ? Limite el contacto con las mascotas y las personas en baeza hogar. Si es posible, quédese en ana laura habitación separada y use un baño separado. ? Limpie y desinfecte baeza hogar todos los días. Use limpiadores domésticos y toallitas o aerosoles desinfectantes. Tenga especial cuidado en limpiar las cosas que agarra con [facility]. Estas incluyen perillas de ayan, controles remotos, teléfonos, y manijas del refrigerador y el microondas. Y no olvide las encimeras (mostradores), mesas, sofya y teclados de computadora. ¿Cuándo debe pedir ayuda? Llame al 911 en cualquier momento que considere que necesita atención de [place]. Por ejemplo, llame si tiene síntomas potencialmente mortales, por ejemplo:    · Tiene grave dificultad para respirar. (No puede hablar en absoluto).   · Tiene dolor o presión ana en el pecho.     · Está seriamente mareado o aturdido.     · Está confuso o no puede pensar con claridad.     · Tiene un tinte azulado en la mitchel y en los labios.     · Se desmaya (pierde el conocimiento) o tiene muchas dificultades para despertarse. Llame a baeza médico ahora mismo o busque atención médica inmediata si:    · Tiene dificultad moderada para respirar. (No puede decir ana laura frase completa).     · Tose con subhash (más de alrededor de 1 cucharadita).     · Tiene señales de presión arterial baja. Estas incluyen sentirse mareado; estar demasiado débil para estar de pie; y [place] la piel fría, pálida y [place].    Preste especial atención a los cambios en baeza vineet y asegúrese de comunicarse con baeza médico si:    · Shelia síntomas empeoran.     · No mejora anil se esperaba. Llame antes de ir al consultorio médico. Siga tiffanie instrucciones. Y use ana laura cubierta facial de shaylee. Revisado: 18 diciembre, 2020               Versión del contenido: 12.8  © 2006-2021 Healthwise, Incorporated. Las instrucciones de cuidado fueron adaptadas bajo licencia por [employer] (which disclaims liability or warranty for this information). Si usted tiene [place] afección médica o sobre estas instrucciones, siempre pregunte a baeza profesional de vineet. Healthwise, Incorporated niega toda garantía o responsabilidad por baeza uso de esta información. [facility]/ED Visit Follow-Up Instructions/Information    You may have an in home follow up visit set up with [facility] or may wish to contact [facility] to set-up a visit:    What are we? [facility] is an in-home urgent care service staffed with emergency trained medical teams. We come to your home in a vehicle stocked with medical supplies and technology. An ER physician is always available if needed. When? As a part of your hospital follow-up, an appointment has been/ or can be set up for us to come see you. Usually, this will be 24-72 hours after you leave the hospital or as needed. [facility] is open 7am-9pm, 7 days a week, 365 days a year, including holidays. Why? We know that you cannot always get to your doctor after being in the hospital and that your doctor is not always available when you need them. Once your workup is complete, we'll call in your prescriptions, update your family doctor, and handle billing with your insurance so you can focus on feeling better, faster without leaving home. How much? We accept most major health insurance plans, including Medicaid, Medicare, and Medicare Advantage [facility] [employer], Alphonsus Fay, [place], and [employer].  We also accept: credit, debit, health savings account (HSA), health reimbursement account (HRA) and flexible spending account (FSA) payments. [facility]'s prices compare to conventional urgent care facilities, but we bring the care to you. How to reach us? Getting care is easy- use our mobile clemente (CourseAdvisor), website ([email or web address]) or call us [phone].

## 2021-04-21 NOTE — PROGRESS NOTES
Bedside shift change report given to Sameer Lin RN (oncoming nurse) by TAMY CHINCHILLA (offgoing nurse). Report included the following information SBAR, Kardex, Intake/Output, MAR, Accordion and Recent Results.

## 2021-04-22 ENCOUNTER — PATIENT OUTREACH (OUTPATIENT)
Dept: CASE MANAGEMENT | Age: 46
End: 2021-04-22

## 2021-04-22 NOTE — PROGRESS NOTES
Used AMI     Patient contacted regarding COVID-19 PNA due to [address]. Discussed COVID-19 related testing which was available at this time. Test results were positive. Patient informed of results, if available? yes     Care Transition Nurse contacted the patient by telephone to perform post discharge assessment. Call within 2 business days of discharge: Yes Verified name and  with patient as identifiers. Provided introduction to self, and explanation of the CTN/ACM role, and reason for call due to risk factors for infection and/or exposure to COVID-19. Symptoms reviewed with patient who verbalized the following symptoms: no new symptoms and no worsening symptoms      Due to no new or worsening symptoms encounter was not routed to provider for escalation. Discussed follow-up appointments. If no appointment was previously scheduled, appointment scheduling offered:  Pt has follow up appt scheduled     [facility] follow up appointment(s):   Future Appointments   Date Time Provider Matilde Carpenter   2021 10:45 AM Nonie Oppenheim,  SFFP BS [facility]     Non-BS follow up appointment(s): NA     Advance Care Planning:   Does patient have an Advance Directive:  not on file. Patient has following risk factors of: no known risk factors and hospital visit to [facility]. CTN reviewed discharge instructions, medical action plan and red flags such as increased shortness of breath, increasing fever and signs of decompensation with patient who verbalized understanding. Discussed exposure protocols and quarantine with CDC Guidelines What to do if you are sick with coronavirus disease .  Patient was given an opportunity for questions and concerns. The patient agrees to contact the [facility] exposure line [phone],  [facility] and PCP office for questions related to their healthcare. CTN provided contact information for future needs.     Reviewed and educated patient on any new and changed medications related to discharge diagnosis     Plan for follow-up call in 5-7 days based on severity of symptoms and risk factors. Used AMI . Pt reports that he is getting hospital prescribed medications today at [facility]. Verified follow up appt with PCP on 4/30/2021. Provided pt with my contact information and [facility] telephone number. Pt is agreeable to a follow up call in 7 days.

## 2021-04-28 ENCOUNTER — PATIENT OUTREACH (OUTPATIENT)
Dept: CASE MANAGEMENT | Age: 46
End: 2021-04-28

## 2021-04-28 NOTE — PROGRESS NOTES
Used AMI  to contact pt and  REGGIE in [address] stating my name, CTN with [facility] calling to check on the pt. Will follow up in one week.

## 2021-04-30 ENCOUNTER — TELEPHONE (OUTPATIENT)
Dept: FAMILY MEDICINE CLINIC | Age: 46
End: 2021-04-30

## 2021-04-30 ENCOUNTER — VIRTUAL VISIT (OUTPATIENT)
Dept: FAMILY MEDICINE CLINIC | Age: 46
End: 2021-04-30

## 2021-04-30 DIAGNOSIS — Z76.89 ENCOUNTER TO ESTABLISH CARE: Primary | ICD-10-CM

## 2021-04-30 DIAGNOSIS — U07.1 COVID-19 VIRUS INFECTION: ICD-10-CM

## 2021-04-30 PROCEDURE — 99202 OFFICE O/P NEW SF 15 MIN: CPT | Performed by: STUDENT IN AN ORGANIZED HEALTH CARE EDUCATION/TRAINING PROGRAM

## 2021-04-30 NOTE — PROGRESS NOTES
Kameron Dailey  48 y.o. male  1975  [id number]  [id number]   [address]:    Telemedicine Progress Note  Kavita Vargas DO       Encounter Date and Time: April 30, 2021 at 10:45 AM    Consent: Ronak Dickinson, who was seen by synchronous (real-time) audio-video technology, and/or his healthcare decision maker, is aware that this patient-initiated, Telehealth encounter on 4/30/2021 is a billable service, with coverage as determined by his insurance carrier. He is aware that he may receive a bill and has provided verbal consent to proceed: Yes. Chief Complaint   Patient presents with    Establish Care     History of Present Illness   Kameron Dailey is a 48 y.o. male was evaluated by synchronous (real-time) audio-video technology from home, through a secure patient portal.    Translation is done by AMN # [id number] with doxy. me. Patient presents to [facility]. Was recently hospitalized at [address] [place] [address] from 4/16-4/21 for hypoxia 2/2 to COVID pneumonia. He was discharged home on 4L of O2, dexamethasone, and Eliqus (PE ppx). Currently he is on 3L however he is feeling way better than when he was discharged. He only has some SOB with walking but otherwise is feeling well. Denies any active fevers. Meds, Allergies, SH, and FH added to chart    Review of Systems   Review of Systems   Constitutional: Negative for chills and fever. HENT: Negative for congestion and sore throat. Respiratory: Positive for shortness of breath. Negative for cough. Cardiovascular: Negative for chest pain and palpitations. Gastrointestinal: Negative for abdominal pain, nausea and vomiting. Neurological: Negative for dizziness and headaches.        Vitals/Objective:     General: alert, cooperative, no distress   Mental  status: mental status: alert, oriented to person, place, and time, normal mood, behavior, speech, dress, motor activity, and thought processes   Resp: resp: normal effort and no respiratory distress   Neuro: neuro: no gross deficits   Skin: skin: no discoloration or lesions of concern on visible areas   Due to this being a TeleHealth evaluation, many elements of the physical examination are unable to be assessed. Assessment and Plan:   48 y.o. M on 3L of oxygen presenting to establish care after hospitalization for COVID pneumonia. 1. Encounter to establish care  - Meds, Allergies, PF, SH added to chart  -Patient encouraged to follow up for a well man visit at earliest convenience. 2. COVID-19 virus infection  -Patient on 3L now due to recent infection. Reports improvement of symptoms and less SOB however no way to check oxygenation status  -Patient encouraged to continue using O2. Will contact nursing navigation to either have him see [facility] or see pulm outpatient for evaluation to wean O2 off  -Patient encouraged to continue to quarantine for a total of 10 days after last fever/when symptoms began  -Patient to continue dexamethasone, Eliquis, and other supplemental medications until course is done. Time spent in direct conversation with the patient to include medical condition(s) discussed, assessment and treatment plan:       We discussed the expected course, resolution and complications of the diagnosis(es) in detail. Medication risks, benefits, costs, interactions, and alternatives were discussed as indicated. I advised him to contact the office if his condition worsens, changes or fails to improve as anticipated. He expressed understanding with the diagnosis(es) and plan. Patient understands that this encounter was a temporary measure, and the importance of further follow up and examination was emphasized. Patient verbalized understanding. Patient informed to follow up: Follow-up and Dispositions  ·   Return for well man visit with lab work.          Electronically Signed: Estevan Garner DO    CPT Codes 17740-41664 for Established Patients may apply to this Telehealth Visit. POS code: 18. Modifier GT    Kameron Alfred is a 48 y.o. male who was evaluated by an audio-video encounter for concerns as above. Patient identification was verified prior to start of the visit. A caregiver was present when appropriate. Due to this being a TeleHealth encounter (During AJEKF-80 public health emergency), evaluation of the following organ systems was limited: Vitals/Constitutional/EENT/Resp/CV/GI//MS/Neuro/Skin/Heme-Lymph-Imm. Pursuant to the emergency declaration under the [address] [place] [address] waiver authority and the [employer] Act, this Virtual Visit was conducted, with patient's (and/or legal guardian's) consent, to reduce the patient's risk of exposure to COVID-19 and provide necessary medical care. Services were provided through a synchronous discussion virtually to substitute for in-person clinic visit. I was at home. The patient was at home. History   Patients past medical, surgical and family histories were reviewed and updated. No past medical history on file. No past surgical history on file.   Family History   Problem Relation Age of Onset    Heart Attack Mother     Diabetes Brother      Social History     Tobacco Use    Smoking status: Former Smoker     Years: 5.00     Quit date:      Years since quittin.3    Smokeless tobacco: Never Used   Substance Use Topics    Alcohol use: Never     Frequency: Never     Binge frequency: Never    Drug use: Never     Patient Active Problem List   Diagnosis Code    Hypoxia R09.02    Pneumonia due to COVID-19 virus U07.1, J12.82    SIRS (systemic inflammatory response syndrome) ([facility]) R65.10    Acute respiratory failure with hypoxia ([facility]) J96.01          Current Medications/Allergies   Medications and Allergies reviewed:    Current Outpatient Medications   Medication Sig Dispense Refill    ascorbic acid, vitamin C, (VITAMIN C) 500 mg tablet Take 1 Tab by mouth daily for 30 days. 30 Tab 0    dexAMETHasone (DECADRON) 6 mg tablet Take 1 Tab by mouth Daily (before breakfast). 4 Tab 0    guaiFENesin ER (MUCINEX) 600 mg ER tablet Take 1 Tab by mouth every twelve (12) hours. 30 Tab 0    zinc gluconate 50 mg tablet Take 1 Tab by mouth daily. 30 Tab 0    apixaban (Eliquis) 2.5 mg tablet Take 1 Tab by mouth two (2) times a day. 30 Tab 0    albuterol (PROVENTIL HFA, VENTOLIN HFA, PROAIR HFA) 90 mcg/actuation inhaler Take 2 Puffs by inhalation every six (6) hours as needed for Wheezing or Shortness of Breath.  1 Inhaler 1     No Known Allergies

## 2021-04-30 NOTE — TELEPHONE ENCOUNTER
----- Message from Ramiro Yap DO sent at 4/30/2021 11:01 AM EDT -----  Regarding: Well man visit  Critical access hospital    This patient will need a visit for a well man.  It can be virtual. Thank you    [facility]

## 2021-05-05 ENCOUNTER — PATIENT OUTREACH (OUTPATIENT)
Dept: CASE MANAGEMENT | Age: 46
End: 2021-05-05

## 2021-05-05 NOTE — PROGRESS NOTES
Patient resolved from Transition of Care episode on 5/5/2021. ACM/CTN was unsuccessful at contacting this patient today. Used AMI  to LVM with pt. Patient/family was provided the following resources and education related to COVID-19 during the initial call:                         Signs, symptoms and red flags related to COVID-19            CDC exposure and quarantine guidelines            [facility] exposure contact - [phone]            Contact for their local Department of Health                 Patient has not had any additional ED or hospital visits. No further outreach scheduled with this CTN/ACM. Episode of Care resolved. Patient has this CTN/ACM contact information if future needs arise.

## 2021-05-18 NOTE — PROGRESS NOTES
[address] Medicine Residency Attending Addendum:  Dr. Jon George, DO,  the patient and I were not physically present during this encounter. The resident and I are concurrently monitoring the patient care through appropriate telecommunication technology. I discussed the findings, assessment and plan with the resident and agree with the resident's findings and plan as documented in the resident's note.       Hesham Curry MD

## 2021-05-20 ENCOUNTER — APPOINTMENT (OUTPATIENT)
Dept: CT IMAGING | Age: 46
End: 2021-05-20
Attending: EMERGENCY MEDICINE

## 2021-05-20 ENCOUNTER — OFFICE VISIT (OUTPATIENT)
Dept: FAMILY MEDICINE CLINIC | Age: 46
End: 2021-05-20

## 2021-05-20 ENCOUNTER — HOSPITAL ENCOUNTER (EMERGENCY)
Age: 46
Discharge: HOME | End: 2021-05-20
Attending: EMERGENCY MEDICINE

## 2021-05-20 VITALS
TEMPERATURE: 97.3 F | HEIGHT: 67 IN | BODY MASS INDEX: 34.12 KG/M2 | OXYGEN SATURATION: 97 % | WEIGHT: 217.37 LBS | SYSTOLIC BLOOD PRESSURE: 122 MMHG | HEART RATE: 109 BPM | RESPIRATION RATE: 18 BRPM | DIASTOLIC BLOOD PRESSURE: 87 MMHG

## 2021-05-20 VITALS
RESPIRATION RATE: 17 BRPM | OXYGEN SATURATION: 99 % | HEART RATE: 120 BPM | TEMPERATURE: 97.1 F | WEIGHT: 217 LBS | BODY MASS INDEX: 42.38 KG/M2 | SYSTOLIC BLOOD PRESSURE: 129 MMHG | DIASTOLIC BLOOD PRESSURE: 86 MMHG

## 2021-05-20 DIAGNOSIS — R07.89 OTHER CHEST PAIN: Primary | ICD-10-CM

## 2021-05-20 DIAGNOSIS — R00.0 TACHYCARDIA: Primary | ICD-10-CM

## 2021-05-20 DIAGNOSIS — Z99.81 ON HOME OXYGEN THERAPY: ICD-10-CM

## 2021-05-20 LAB
ALBUMIN SERPL-MCNC: 3.6 G/DL (ref 3.5–5)
ALBUMIN/GLOB SERPL: 1.1 {RATIO} (ref 1.1–2.2)
ALP SERPL-CCNC: 89 U/L (ref 45–117)
ALT SERPL-CCNC: 61 U/L (ref 12–78)
ANION GAP SERPL CALC-SCNC: 5 MMOL/L (ref 5–15)
APTT PPP: 26.9 SEC (ref 22.1–31)
AST SERPL-CCNC: 29 U/L (ref 15–37)
BASOPHILS # BLD: 0.1 K/UL (ref 0–0.1)
BASOPHILS NFR BLD: 1 % (ref 0–1)
BILIRUB SERPL-MCNC: 0.2 MG/DL (ref 0.2–1)
BNP SERPL-MCNC: 33 PG/ML
BUN SERPL-MCNC: 10 MG/DL (ref 6–20)
BUN/CREAT SERPL: 15 (ref 12–20)
CALCIUM SERPL-MCNC: 8.1 MG/DL (ref 8.5–10.1)
CHLORIDE SERPL-SCNC: 105 MMOL/L (ref 97–108)
CO2 SERPL-SCNC: 28 MMOL/L (ref 21–32)
COMMENT, HOLDF: NORMAL
CREAT SERPL-MCNC: 0.68 MG/DL (ref 0.7–1.3)
DIFFERENTIAL METHOD BLD: ABNORMAL
EOSINOPHIL # BLD: 0.1 K/UL (ref 0–0.4)
EOSINOPHIL NFR BLD: 1 % (ref 0–7)
ERYTHROCYTE [DISTWIDTH] IN BLOOD BY AUTOMATED COUNT: 12.7 % (ref 11.5–14.5)
GLOBULIN SER CALC-MCNC: 3.4 G/DL (ref 2–4)
GLUCOSE SERPL-MCNC: 93 MG/DL (ref 65–100)
HCT VFR BLD AUTO: 39 % (ref 36.6–50.3)
HGB BLD-MCNC: 13.6 G/DL (ref 12.1–17)
IMM GRANULOCYTES # BLD AUTO: 0.1 K/UL (ref 0–0.04)
IMM GRANULOCYTES NFR BLD AUTO: 1 % (ref 0–0.5)
INR PPP: 1.1 (ref 0.9–1.1)
LIPASE SERPL-CCNC: 80 U/L (ref 73–393)
LYMPHOCYTES # BLD: 2.1 K/UL (ref 0.8–3.5)
LYMPHOCYTES NFR BLD: 22 % (ref 12–49)
MAGNESIUM SERPL-MCNC: 2.2 MG/DL (ref 1.6–2.4)
MCH RBC QN AUTO: 30.4 PG (ref 26–34)
MCHC RBC AUTO-ENTMCNC: 34.9 G/DL (ref 30–36.5)
MCV RBC AUTO: 87.1 FL (ref 80–99)
MONOCYTES # BLD: 0.6 K/UL (ref 0–1)
MONOCYTES NFR BLD: 6 % (ref 5–13)
NEUTS SEG # BLD: 7 K/UL (ref 1.8–8)
NEUTS SEG NFR BLD: 69 % (ref 32–75)
NRBC # BLD: 0 K/UL (ref 0–0.01)
NRBC BLD-RTO: 0 PER 100 WBC
PLATELET # BLD AUTO: 324 K/UL (ref 150–400)
PMV BLD AUTO: 10 FL (ref 8.9–12.9)
POTASSIUM SERPL-SCNC: 3.6 MMOL/L (ref 3.5–5.1)
PROT SERPL-MCNC: 7 G/DL (ref 6.4–8.2)
PROTHROMBIN TIME: 11.5 SEC (ref 9–11.1)
RBC # BLD AUTO: 4.48 M/UL (ref 4.1–5.7)
SAMPLES BEING HELD,HOLD: NORMAL
SODIUM SERPL-SCNC: 138 MMOL/L (ref 136–145)
THERAPEUTIC RANGE,PTTT: NORMAL SECS (ref 58–77)
TROPONIN I SERPL-MCNC: <0.05 NG/ML
WBC # BLD AUTO: 9.9 K/UL (ref 4.1–11.1)

## 2021-05-20 PROCEDURE — 83735 ASSAY OF MAGNESIUM: CPT

## 2021-05-20 PROCEDURE — 85025 COMPLETE CBC W/AUTO DIFF WBC: CPT

## 2021-05-20 PROCEDURE — 93000 ELECTROCARDIOGRAM COMPLETE: CPT | Performed by: STUDENT IN AN ORGANIZED HEALTH CARE EDUCATION/TRAINING PROGRAM

## 2021-05-20 PROCEDURE — 83880 ASSAY OF NATRIURETIC PEPTIDE: CPT

## 2021-05-20 PROCEDURE — 74011250636 HC RX REV CODE- 250/636: Performed by: EMERGENCY MEDICINE

## 2021-05-20 PROCEDURE — 80053 COMPREHEN METABOLIC PANEL: CPT

## 2021-05-20 PROCEDURE — 74011000636 HC RX REV CODE- 636: Performed by: RADIOLOGY

## 2021-05-20 PROCEDURE — 93005 ELECTROCARDIOGRAM TRACING: CPT

## 2021-05-20 PROCEDURE — 83690 ASSAY OF LIPASE: CPT

## 2021-05-20 PROCEDURE — 84484 ASSAY OF TROPONIN QUANT: CPT

## 2021-05-20 PROCEDURE — 36415 COLL VENOUS BLD VENIPUNCTURE: CPT

## 2021-05-20 PROCEDURE — 99215 OFFICE O/P EST HI 40 MIN: CPT | Performed by: STUDENT IN AN ORGANIZED HEALTH CARE EDUCATION/TRAINING PROGRAM

## 2021-05-20 PROCEDURE — 71275 CT ANGIOGRAPHY CHEST: CPT

## 2021-05-20 PROCEDURE — 85730 THROMBOPLASTIN TIME PARTIAL: CPT

## 2021-05-20 PROCEDURE — 85610 PROTHROMBIN TIME: CPT

## 2021-05-20 PROCEDURE — 99284 EMERGENCY DEPT VISIT MOD MDM: CPT

## 2021-05-20 RX ADMIN — SODIUM CHLORIDE 1000 ML: 9 INJECTION, SOLUTION INTRAVENOUS at 17:33

## 2021-05-20 RX ADMIN — IOPAMIDOL 80 ML: 755 INJECTION, SOLUTION INTRAVENOUS at 17:19

## 2021-05-20 NOTE — ED NOTES
Pt's home O2 tank empty. O2 saturations 97% on RA. Called [facility] @ [phone] & requested refill to be delivered to pt's home.

## 2021-05-20 NOTE — PROGRESS NOTES
Tomas Key is an 48 y.o. male who is recently discharged from hospital who presents for [place] visit.  [id number] used for encounter. Patient was admitted for COVID PNA. Discharged on 4L O2. Currently on 3L. Was discharged on eliquis 2.5 BID. Ran out 8 days ago. Endorses chest pain with deep inspiration. Denies SOB, abd pain, N, V, diarrhea, constipation, fever, chills, dysuria. Allergies - reviewed:   No Known Allergies      Medications - reviewed:   Current Outpatient Medications   Medication Sig    ascorbic acid, vitamin C, (VITAMIN C) 500 mg tablet Take 1 Tab by mouth daily for 30 days.  dexAMETHasone (DECADRON) 6 mg tablet Take 1 Tab by mouth Daily (before breakfast).  guaiFENesin ER (MUCINEX) 600 mg ER tablet Take 1 Tab by mouth every twelve (12) hours.  zinc gluconate 50 mg tablet Take 1 Tab by mouth daily.  apixaban (Eliquis) 2.5 mg tablet Take 1 Tab by mouth two (2) times a day.  albuterol (PROVENTIL HFA, VENTOLIN HFA, PROAIR HFA) 90 mcg/actuation inhaler Take 2 Puffs by inhalation every six (6) hours as needed for Wheezing or Shortness of Breath. No current facility-administered medications for this visit. Past Medical History - reviewed:  No past medical history on file. Past Surgical History - reviewed:   No past surgical history on file.       Social History - reviewed:  Social History     Socioeconomic History    Marital status:      Spouse name: Not on file    Number of children: Not on file    Years of education: Not on file    Highest education level: Not on file   Occupational History    Not on file   Tobacco Use    Smoking status: Former Smoker     Years: 5.00     Quit date:      Years since quittin.4    Smokeless tobacco: Never Used   Substance and Sexual Activity    Alcohol use: Never    Drug use: Never    Sexual activity: Not on file   Other Topics Concern    Not on file   Social History Narrative    Not on file     Social Determinants of Health     Financial Resource Strain:     Difficulty of Paying Living Expenses:    Food Insecurity:     Worried About Running Out of Food in the Last Year:     [address] in the Last Year:    Transportation Needs:     Lack of Transportation (Medical):  Lack of Transportation (Non-Medical):    Physical Activity:     Days of Exercise per Week:     Minutes of Exercise per Session:    Stress:     Feeling of Stress :    Social Connections:     Frequency of Communication with Friends and Family:     Frequency of Social Gatherings with Friends and Family:     Attends [employer] Services:     Active Member of Clubs or Organizations:     Attends Club or Organization Meetings:     Marital Status:    Intimate Partner Violence:     Fear of Current or Ex-Partner:     Emotionally Abused:     Physically Abused:     Sexually Abused:          Family History - reviewed:  Family History   Problem Relation Age of Onset    Heart Attack Mother     Diabetes Brother          Immunizations - reviewed: There is no immunization history on file for this patient. ROS  In HPI      Physical Exam  Visit Vitals  /86   Pulse (!) 120   Temp 97.1 °F (36.2 °C) (Temporal)   Resp 17   Wt 217 lb (98.4 kg)   SpO2 99%   BMI 42.38 kg/m²       General appearance - alert, well appearing, and in no distress. On 3L O2  Mouth - mucous membranes moist, pharynx normal without lesions  Neck - supple, no significant adenopathy  Chest - clear to auscultation, no wheezes, rales or rhonchi, symmetric air entry  Heart - Tachycardic , regular rhythm, normal S1, S2, no murmurs, rubs, clicks or gallops  Abdomen - soft, nontender, nondistended, no masses or organomegaly    Assessment/Plan    ICD-10-CM ICD-9-CM    1. Tachycardia  R00.0 785.0 AMB POC EKG ROUTINE W/ 12 LEADS, INTER & REP   Recently hospitalized with COVID PNA. Was on eliquis (stopped 8 days ago). Discharged on 4L o2. Currently on 3L O2. Tachycardic to 120s (not tachycardic while in hospital). EKG sinus tach. Given recent hospitalization and having COVID PNA and no CTA on file; must rule out PE.  - Sent to ED for CTA. Patient expressed understanding and agreement. Stated he will go immediately. Has family member waiting to drive him  - ED informed          I have discussed the diagnosis with the patient and the intended plan as seen in the above orders. Patient verbalized understanding of the plan and agrees with the plan. The patient has received an after-visit summary and questions were answered concerning future plans. I have discussed medication side effects and warnings with the patient as well. Informed patient to return to the office if new symptoms arise.         Brandie Rios MD  Family Medicine Resident

## 2021-05-20 NOTE — ED TRIAGE NOTES
Patient arrives from [facility] to rule out PE. Patient presents on 3 L NC, with SpO2: 97. Reports mid-sternal chest pain with \"deep inspiration. \"  States symptoms present for past month. Denies chest pain at rest, SOB, N/V/D. Patient reports having covid-19 last month and hospitalized at [facility].

## 2021-05-20 NOTE — PROGRESS NOTES
Chief Complaint   Patient presents with    Irregular Heart Beat     1. Have you been to the ER, urgent care clinic since your last visit? Hospitalized since your last visit? Yes. [facility]. 2. Have you seen or consulted any other health care providers outside of the [address] [place] [address] since your last visit? Include any pap smears or colon screening.  No

## 2021-05-20 NOTE — DISCHARGE INSTRUCTIONS
Thank you for allowing us to provide you with medical care today. We realize that you have many choices for your emergency care needs. We thank you for choosing [facility]. Please choose us in the future for any continued health care needs. We hope we addressed all of your medical concerns. We strive to provide excellent quality care in the Emergency Department. Anything less than excellent does not meet our expectations. The exam and treatment you received in the Emergency Department were for an emergent problem and are not intended as complete care. It is important that you follow up with a doctor, nurse practitioner, or [address] [place] [address] assistant for ongoing care. If your symptoms worsen or you do not improve as expected and you are unable to reach your usual health care provider, you should return to the Emergency Department. We are available 24 hours a day. Take this sheet with you when you go to your follow-up visit. If you have any problem arranging the follow-up visit, contact the Emergency Department immediately. Make an appointment your family doctor for follow up of this visit. Return to the ER if you are unable to be seen in a timely manner.

## 2021-05-20 NOTE — ED NOTES
MD Danni Leo reviewed discharge instructions with the patient. The patient verbalized understanding. Pt confirmed understanding of need for follow up with primary care provider. Important sections of discharge instructions highlighted for patient. Pt is not in any current distress and shows no evidence of clinical instability. Pt is hemodynamically/respiratorily stable. Paperwork given by provider and reviewed with patient, opportunity for questions/clarification given. Pt was given work note if applicable/requested. Pt denies any additional complaints. Pt walked out of ED.     Patient Vitals for the past 4 hrs:   Temp Pulse Resp BP SpO2   05/20/21 1845    122/87 99 %   05/20/21 1800    (!) 108/96 99 %   05/20/21 1738    (!) 131/90 100 %   05/20/21 1700     98 %   05/20/21 1653 97.3 °F (36.3 °C) (!) 109 18 (!) 149/84 98 %       Reviewed w/  Darrell # Johanna, RN

## 2021-05-20 NOTE — PATIENT INSTRUCTIONS
You may have a clot in your lungs. Please go to the emergency room to get a CTA of the chest to rule out a clot. Please go immediately.

## 2021-05-20 NOTE — ED PROVIDER NOTES
Please note that this dictation was completed with [employer], the computer voice recognition software.  Quite often unanticipated grammatical, syntax, homophones, and other interpretive errors are inadvertently transcribed by the computer software.  Please disregard these errors.  Please excuse any errors that have escaped final proofreading. 56-year-old  male  service utilized as he is Faroese-speaking only past medical history markable for hospitalization for Covid approximately 1 month ago now on home oxygen presents to the ER from the family medicine office for evaluation of increased shortness of breath chest pain with deep inspirations. Per the patient the symptoms been going approximately 1 month. Patient states she is not having exertional chest pain is more \"if I take a deep breath. \"  Patient denies fevers chills states he is tolerating p.o. normally has not taken anything for his symptoms. He is a non-smoker and is not had to increase his home oxygen. Patient is asking if he needs to continue his home oxygen we explained that was between he and his family medicine doctors. They have sent him here from the clinic for a CT scan of his chest we will evaluate his chest pain further. Discussed these plans with the patient he agrees with these plans. Patient states he is normally on 3 L nasal cannula at baseline. pt denies HA, vison changes, diff swallowing, Abd pain, F/Ch, N/V, D/Cons or other current systemic complaints    Social/ PSH reviewed in EMR    EMR Chart Reviewed           No past medical history on file. No past surgical history on file.       Family History:   Problem Relation Age of Onset    Heart Attack Mother     Diabetes Brother        Social History     Socioeconomic History    Marital status:      Spouse name: Not on file    Number of children: Not on file    Years of education: Not on file    Highest education level: Not on file   Occupational History    Not on file   Tobacco Use    Smoking status: Former Smoker     Years: 5.00     Quit date:      Years since quittin.4    Smokeless tobacco: Never Used   Substance and Sexual Activity    Alcohol use: Never    Drug use: Never    Sexual activity: Not on file   Other Topics Concern    Not on file   Social History Narrative    Not on file     Social Determinants of Health     Financial Resource Strain:     Difficulty of Paying Living Expenses:    Food Insecurity:     Worried About Running Out of Food in the Last Year:     [address] in the Last Year:    Transportation Needs:     Lack of Transportation (Medical):  Lack of Transportation (Non-Medical):    Physical Activity:     Days of Exercise per Week:     Minutes of Exercise per Session:    Stress:     Feeling of Stress :    Social Connections:     Frequency of Communication with Friends and Family:     Frequency of Social Gatherings with Friends and Family:     Attends Anglican Services:     Active Member of Clubs or Organizations:     Attends Club or Organization Meetings:     Marital Status:    Intimate Partner Violence:     Fear of Current or Ex-Partner:     Emotionally Abused:     Physically Abused:     Sexually Abused: ALLERGIES: Patient has no known allergies. Review of Systems   Constitutional: Negative for appetite change, chills and fever. HENT: Negative for drooling, rhinorrhea, sore throat, trouble swallowing and voice change. Eyes: Negative for visual disturbance. Respiratory: Positive for chest tightness and shortness of breath. Negative for wheezing and stridor. Cardiovascular: Positive for chest pain. Negative for palpitations and leg swelling. Gastrointestinal: Negative for abdominal pain, diarrhea, nausea and vomiting. Genitourinary: Negative for dysuria, scrotal swelling and testicular pain. Musculoskeletal: Negative for back pain.    Neurological: Negative for facial asymmetry and speech difficulty. Psychiatric/Behavioral: Negative for confusion. All other systems reviewed and are negative. Vitals:    05/20/21 1653   BP: (!) 149/84   Pulse: (!) 109   Resp: 18   Temp: 97.3 °F (36.3 °C)   SpO2: 98%   Weight: 98.6 kg (217 lb 6 oz)   Height: 5' 7\" (1.702 m)            Physical Exam  Vitals and nursing note reviewed. Constitutional:       General: He is not in acute distress. Appearance: Normal appearance. He is well-developed. He is obese. He is not ill-appearing, toxic-appearing or diaphoretic. Comments: NAD, AxOx4, speaking in complete sentences    On NC   HENT:      Head: Normocephalic and atraumatic. Right Ear: External ear normal.      Left Ear: External ear normal.      Mouth/Throat:      Pharynx: No oropharyngeal exudate. Eyes:      General: No scleral icterus. Right eye: No discharge. Left eye: No discharge. Extraocular Movements: Extraocular movements intact. Conjunctiva/sclera: Conjunctivae normal.      Pupils: Pupils are equal, round, and reactive to light. Cardiovascular:      Rate and Rhythm: Normal rate and regular rhythm. Pulses: Normal pulses. Heart sounds: Normal heart sounds. No murmur heard. No friction rub. No gallop. Pulmonary:      Effort: Pulmonary effort is normal. No respiratory distress. Breath sounds: Normal breath sounds. No stridor. No wheezing, rhonchi or rales. Chest:      Chest wall: No tenderness. Abdominal:      General: Bowel sounds are normal. There is no distension. Palpations: Abdomen is soft. There is no mass. Tenderness: There is no abdominal tenderness. There is no guarding or rebound. Hernia: No hernia is present. Comments: nttp     Genitourinary:     Comments: Pt denies urinary/ Testicular/ scrotal or penile  complaints  Musculoskeletal:         General: No swelling, tenderness, deformity or signs of injury. Normal range of motion. Cervical back: Normal range of motion and neck supple. No rigidity or tenderness. Right lower leg: No edema. Left lower leg: No edema. Lymphadenopathy:      Cervical: No cervical adenopathy. Skin:     General: Skin is warm and dry. Capillary Refill: Capillary refill takes less than 2 seconds. Coloration: Skin is not jaundiced or pale. Findings: No bruising, erythema, lesion or rash. Neurological:      General: No focal deficit present. Mental Status: He is alert and oriented to person, place, and time. Cranial Nerves: No cranial nerve deficit. Sensory: No sensory deficit. Motor: No weakness. Coordination: Coordination normal.      Gait: Gait normal.      Deep Tendon Reflexes: Reflexes normal.          MDM  Number of Diagnoses or Management Options         Procedures    Chief Complaint   Patient presents with    Chest Pain       5:10 PM  The patients presenting problems have been discussed, and they are in agreement with the care plan formulated and outlined with them. I have encouraged them to ask questions as they arise throughout their visit. MEDICATIONS GIVEN:  Medications   sodium chloride 0.9 % bolus infusion 1,000 mL (has no administration in time range)   iopamidoL (ISOVUE-370) 76 % injection 100 mL (has no administration in time range)       LABS REVIEWED:  Labs Reviewed   TROPONIN I   PROTHROMBIN TIME + INR   PTT   SAMPLES BEING HELD   MAGNESIUM   METABOLIC PANEL, COMPREHENSIVE   CBC WITH AUTOMATED DIFF   LIPASE   NT-PRO BNP       RADIOLOGY RESULTS:  The following have been ordered and reviewed:  _____________________________________________________________________  _____________________________________________________________________    EKG interpretation:   Rhythm: sinus tachycardia rhythm; and regular . Rate (approx.): 104;  Axis: normal; P wave: normal; QRS interval: normal ; ST/T wave: normal; Negative acute significant segmental elevations/ unchanged compared to study dated 04/19/2021    PROCEDURES:        CONSULTATIONS:       PROGRESS NOTES:      DIAGNOSIS:    1. Other chest pain    2. On home oxygen therapy        PLAN:  1-Chest Pain / neg ed evaluation - will have pt follow-up with Cardiology;   2 Home O2 - PCP;       ED COURSE: The patients hospital course has been uncomplicated. 6:48 PM  Kameron Brunson  results have been reviewed with him. He has been counseled regarding his diagnosis. He verbally conveys understanding and agreement of the signs, symptoms, diagnosis, treatment and prognosis and additionally agrees to Call/ Arrange follow up as recommended with Dr. Bonnie Mccray MD in 24 - 48 hours. He also agrees with the care-plan and conveys that all of his questions have been answered. I have also put together some discharge instructions for him that include: 1) educational information regarding their diagnosis, 2) how to care for their diagnosis at home, as well a 3) list of reasons why they would want to return to the ED prior to their follow-up appointment, should their condition change or for concerns.

## 2021-05-21 LAB
ATRIAL RATE: 104 BPM
CALCULATED P AXIS, ECG09: 17 DEGREES
CALCULATED R AXIS, ECG10: 19 DEGREES
CALCULATED T AXIS, ECG11: 8 DEGREES
DIAGNOSIS, 93000: NORMAL
P-R INTERVAL, ECG05: 148 MS
Q-T INTERVAL, ECG07: 354 MS
QRS DURATION, ECG06: 88 MS
QTC CALCULATION (BEZET), ECG08: 465 MS
VENTRICULAR RATE, ECG03: 104 BPM

## 2021-06-14 NOTE — PROGRESS NOTES
CARDIOLOGY OFFICE NOTE    Dejuan Barillas MD, [address], [place], [address] [facility]  Phone [phone]; Fax [phone]  Mobile [phone]   Voice Mail [phone]    LAST OFFICE VISIT : Visit date not found  Fran Fitch MD       ATTENTION:   This medical record was transcribed using an electronic medical records/speech recognition system. Although proofread, it may and can contain electronic, spelling and other errors. Corrections may be executed at a later time. Please feel free to contact us for any clarifications as needed. ICD-10-CM ICD-9-CM   1. Acute respiratory failure with hypoxia ([facility].)  J96.01 518.81            Kameron Pickett is a 48 y.o. male with  referred for shortness of breath and chest discomfort. .  The patient denies chest pain/ shortness of breath, orthopnea, PND, LE edema, palpitations, syncope, presyncope or fatigue. Cardiac risk factors: family history, male gender  I have personally obtained the history from the patient. HISTORY OF PRESENTING ILLNESS    Mr. Hue Jeffery is a 49 yo Cambodian-speaking and comes in with history of shortness of breath and chest discomfort. He has a history of Covid exposure and history of was on home oxygen. He is not having exertional chest pain but more chest pain with taking a deep breath in. He is a non-smoker his proBNP and troponins were negative EKG showed a sinus tachycardia. Today he states he is no longer using oxygen is not having any chest discomfort or shortness of breath. He has no other cardiac issues. ACTIVE PROBLEM LIST     Patient Active Problem List    Diagnosis Date Noted    Acute respiratory failure with hypoxia ([facility] 04/21/2021    Hypoxia 04/16/2021    Pneumonia due to COVID-19 virus 04/16/2021    SIRS (systemic inflammatory response syndrome) ([facility] 04/16/2021           PAST MEDICAL HISTORY     No past medical history on file.         PAST SURGICAL HISTORY     No past surgical history on file. ALLERGIES     No Known Allergies       FAMILY HISTORY     Family History   Problem Relation Age of Onset    Heart Attack Mother     Diabetes Brother     negative for cardiac disease       SOCIAL HISTORY     Social History     Socioeconomic History    Marital status:      Spouse name: Not on file    Number of children: Not on file    Years of education: Not on file    Highest education level: Not on file   Tobacco Use    Smoking status: Former Smoker     Years: 5.00     Quit date:      Years since quittin.4    Smokeless tobacco: Never Used   Substance and Sexual Activity    Alcohol use: Never    Drug use: Never     Social Determinants of Health     Financial Resource Strain:     Difficulty of Paying Living Expenses:    Food Insecurity:     Worried About Running Out of Food in the Last Year:     [address] in the Last Year:    Transportation Needs:     Lack of Transportation (Medical):  Lack of Transportation (Non-Medical):    Physical Activity:     Days of Exercise per Week:     Minutes of Exercise per Session:    Stress:     Feeling of Stress :    Social Connections:     Frequency of Communication with Friends and Family:     Frequency of Social Gatherings with Friends and Family:     Attends Zoroastrian Services:     Active Member of Clubs or Organizations:     Attends Club or Organization Meetings:     Marital Status:          MEDICATIONS     Current Outpatient Medications   Medication Sig    ascorbic acid, vitamin C, (Vitamin C) 500 mg tablet Take  by mouth.  dexAMETHasone (DECADRON) 6 mg tablet Take 1 Tab by mouth Daily (before breakfast).  guaiFENesin ER (MUCINEX) 600 mg ER tablet Take 1 Tab by mouth every twelve (12) hours.  zinc gluconate 50 mg tablet Take 1 Tab by mouth daily.  apixaban (Eliquis) 2.5 mg tablet Take 1 Tab by mouth two (2) times a day.     albuterol (PROVENTIL HFA, VENTOLIN HFA, PROAIR HFA) 90 mcg/actuation inhaler Take 2 Puffs by inhalation every six (6) hours as needed for Wheezing or Shortness of Breath. No current facility-administered medications for this visit. I have reviewed the nurses notes, vitals, problem list, allergy list, medical history, family, social history and medications. REVIEW OF SYMPTOMS   Pertinent positives per HPI  General: Pt denies excessive weight gain or loss. Pt is able to conduct ADL's  HEENT: Denies blurred vision, headaches, hearing loss, epistaxis and difficulty swallowing. Respiratory: Denies cough, congestion, shortness of breath, ALDANA, wheezing or stridor. Cardiovascular: Denies precordial pain, palpitations, edema or PND  Gastrointestinal: Denies poor appetite, indigestion, abdominal pain or blood in stool  Genitourinary: Denies hematuria, dysuria, increased urinary frequency  Musculoskeletal: Denies joint pain or swelling from muscles or joints  Neurologic: Denies tremor, paresthesias, headache, or sensory motor disturbance  Psychiatric: Denies confusion, insomnia, depression  Integumentray: Denies rash, itching or ulcers. Hematologic: Denies easy bruising, bleeding     PHYSICAL EXAMINATION      Vitals:    06/15/21 0911   BP: 122/88   Pulse: 88   SpO2: 97%   Weight: 215 lb (97.5 kg)   Height: 5' 7\" (1.702 m)     General: Well developed, in no acute distress. HEENT: No jaundice, oral mucosa moist, no oral ulcers  Neck: Supple, no stiffness, no lymphadenopathy, supple  Heart:  Normal S1/S2 negative S3 or S4. Regular, no murmur, gallop or rub, no jugular venous distention  Respiratory: Clear bilaterally x 4, no wheezing or rales  Abdomen:   Soft, non-tender, bowel sounds are active. Extremities:  No edema, normal cap refill, no cyanosis. Musculoskeletal: No clubbing, no deformities  Neuro: A&Ox3, speech clear, gait stable, cooperative, no focal neurologic deficits  Skin: Skin color is normal. No rashes or lesions.  Non diaphoretic, moist.  Vascular: 2+ pulses symmetric in all extremities        EK: Sinus tachycardia     DIAGNOSTIC DATA     1. CTA Chest  21- No evidence of pulmonary embolus. Mild scattered areas of peripheral groundglass opacity, predominantly  involving the upper lobes; this may be the result sequela of current or  prior/resolving COVID infection. LABORATORY DATA            Lab Results   Component Value Date/Time    WBC 9.9 2021 05:34 PM    HGB 13.6 2021 05:34 PM    HCT 39.0 2021 05:34 PM    PLATELET 718  05:34 PM    MCV 87.1 2021 05:34 PM      Lab Results   Component Value Date/Time    Sodium 138 2021 05:34 PM    Potassium 3.6 2021 05:34 PM    Chloride 105 2021 05:34 PM    CO2 28 2021 05:34 PM    Anion gap 5 2021 05:34 PM    Glucose 93 2021 05:34 PM    BUN 10 2021 05:34 PM    Creatinine 0.68 (L) 2021 05:34 PM    BUN/Creatinine ratio 15 2021 05:34 PM    GFR est AA >60 2021 05:34 PM    GFR est non-AA >60 2021 05:34 PM    Calcium 8.1 (L) 2021 05:34 PM    Bilirubin, total 0.2 2021 05:34 PM    Alk. phosphatase 89 2021 05:34 PM    Protein, total 7.0 2021 05:34 PM    Albumin 3.6 2021 05:34 PM    Globulin 3.4 2021 05:34 PM    A-G Ratio 1.1 2021 05:34 PM    ALT (SGPT) 61 2021 05:34 PM           ASSESSMENT/RECOMMENDATIONS:.      1.  Chest discomfort/shortness of breath  -He states that he no longer has the chest discomfort or shortness of breath and is rather stoic today.  -We will go forward with echocardiogram secondary to his history of Covid  -He states he is active with no cardiac symptoms I do not think we need to do a stress test at this point  2. Screening cholesterol  -We will provide him a lab requisition to get a fasting cholesterol today  3.  Follow up in 6-8 weeks    Orders Placed This Encounter    LIPID PANEL     Standing Status:   Future Standing Expiration Date:   6/15/2022    HEPATIC FUNCTION PANEL     Standing Status:   Future     Standing Expiration Date:   6/15/2022    ascorbic acid, vitamin C, (Vitamin C) 500 mg tablet     Sig: Take  by mouth. We discussed the expected course, resolution and complications of the diagnosis(es) in detail. Medication risks, benefits, costs, interactions, and alternatives were discussed as indicated. I advised him to contact the office if his condition worsens, changes or fails to improve as anticipated. He expressed understanding with the diagnosis(es) and plan          Follow-up and Dispositions  ·   Return in about 6 weeks (around 7/27/2021). I have discussed the diagnosis with  [facility] and the intended plan as seen in the above orders. Questions were answered concerning future plans. I have discussed medication side effects and warnings with the patient as well. Thank you,  Cheng Warner MD for involving me in the care of  [facility]. Please do not hesitate to contact me for further questions/concerns. Dejuan Moore MD, [address] [place] [address] Rd[address]  [address]      [facility], [address] [place] [address] Moris Carrillo.      [phone] / [phone] Fax

## 2021-06-15 ENCOUNTER — OFFICE VISIT (OUTPATIENT)
Dept: CARDIOLOGY CLINIC | Age: 46
End: 2021-06-15

## 2021-06-15 ENCOUNTER — OFFICE VISIT (OUTPATIENT)
Dept: FAMILY MEDICINE CLINIC | Age: 46
End: 2021-06-15

## 2021-06-15 VITALS
RESPIRATION RATE: 16 BRPM | OXYGEN SATURATION: 98 % | HEART RATE: 97 BPM | SYSTOLIC BLOOD PRESSURE: 117 MMHG | DIASTOLIC BLOOD PRESSURE: 68 MMHG | HEIGHT: 67 IN | WEIGHT: 216 LBS | TEMPERATURE: 97.5 F | BODY MASS INDEX: 33.9 KG/M2

## 2021-06-15 VITALS
SYSTOLIC BLOOD PRESSURE: 122 MMHG | DIASTOLIC BLOOD PRESSURE: 88 MMHG | HEIGHT: 67 IN | HEART RATE: 88 BPM | WEIGHT: 215 LBS | OXYGEN SATURATION: 97 % | BODY MASS INDEX: 33.74 KG/M2

## 2021-06-15 DIAGNOSIS — J96.01 ACUTE RESPIRATORY FAILURE WITH HYPOXIA (HCC): Primary | ICD-10-CM

## 2021-06-15 DIAGNOSIS — Z86.16 HISTORY OF COVID-19: ICD-10-CM

## 2021-06-15 DIAGNOSIS — R09.02 HYPOXIA: Primary | ICD-10-CM

## 2021-06-15 PROCEDURE — 99213 OFFICE O/P EST LOW 20 MIN: CPT | Performed by: STUDENT IN AN ORGANIZED HEALTH CARE EDUCATION/TRAINING PROGRAM

## 2021-06-15 PROCEDURE — 99204 OFFICE O/P NEW MOD 45 MIN: CPT | Performed by: SPECIALIST

## 2021-06-15 RX ORDER — ASCORBIC ACID 500 MG
TABLET ORAL
COMMUNITY

## 2021-06-15 NOTE — PROGRESS NOTES
Subjective   Kameron Contreras is an 48 y.o. male presents for follow up of his hypoxia. Due to language barrier, an  was used with this patient - [facility] [employer]  Barbara Molina. Was seein in ED 5/20 for hypoxia/increased chest discomfort after COVID PNA in April 2021. Negative workup. Saw Cardiology this AM and plan is for ECHO. Doing much better than before. No chest pain or difficulty breathing. Stopped using O2. Review of Systems   Review of Systems   Constitutional: Negative for chills and fever. HENT: Negative for congestion. Respiratory: Negative for cough and shortness of breath. Cardiovascular: Negative for chest pain, palpitations and leg swelling. Gastrointestinal: Negative for abdominal pain, nausea and vomiting. Skin: Negative for rash. Allergies   No Known Allergies    Medications  Current Outpatient Medications   Medication Sig    ascorbic acid, vitamin C, (Vitamin C) 500 mg tablet Take  by mouth.  dexAMETHasone (DECADRON) 6 mg tablet Take 1 Tab by mouth Daily (before breakfast).  guaiFENesin ER (MUCINEX) 600 mg ER tablet Take 1 Tab by mouth every twelve (12) hours.  zinc gluconate 50 mg tablet Take 1 Tab by mouth daily.  apixaban (Eliquis) 2.5 mg tablet Take 1 Tab by mouth two (2) times a day.  albuterol (PROVENTIL HFA, VENTOLIN HFA, PROAIR HFA) 90 mcg/actuation inhaler Take 2 Puffs by inhalation every six (6) hours as needed for Wheezing or Shortness of Breath. No current facility-administered medications for this visit. Medical History  No past medical history on file. Immunizations     There is no immunization history on file for this patient. Objective   Vital Signs  Visit Vitals  /68   Pulse 97   Temp 97.5 °F (36.4 °C) (Temporal)   Resp 16   Ht 5' 7\" (1.702 m)   Wt 216 lb (98 kg)   SpO2 98%   BMI 33.83 kg/m²       Physical Examination  Physical Exam  Vitals reviewed.    Constitutional:       General: He is not in acute distress. Appearance: He is not ill-appearing. HENT:      Head: Normocephalic and atraumatic. Eyes:      Conjunctiva/sclera: Conjunctivae normal.   Cardiovascular:      Rate and Rhythm: Normal rate and regular rhythm. Heart sounds: Normal heart sounds. No murmur heard. Pulmonary:      Effort: Pulmonary effort is normal. No respiratory distress. Breath sounds: Normal breath sounds. No wheezing. Abdominal:      General: There is no distension. Palpations: Abdomen is soft. Tenderness: There is no abdominal tenderness. Musculoskeletal:      Right lower leg: No edema. Left lower leg: No edema. Skin:     General: Skin is warm. Findings: No rash. Neurological:      General: No focal deficit present. Mental Status: He is alert. Assessment   Kameron Georges is a 48 y.o. who presents for follow up of hypoxia 2/2 COVID PNA in April 2021. Plan   1. Hypoxia No longer using O2. O2 sat 98% on RA. Saw Cardiology this AM.  - has ECHO planned with Cardiology  - follow up if symptoms arise    2. History of COVID-19 All symptoms resolved. I have discussed the aforementioned diagnoses and plan with the patient in detail. I have provided information in person and/or in AVS. All questions answered prior to discharge.       I discussed this patient with Dr. Alex Funez (Attending Physician)   Signed By:  Josue Lopez DO    Family Medicine Resident

## 2021-06-15 NOTE — PROGRESS NOTES
Chief Complaint   Patient presents with    O2/Oxygen     Hypoxia     1. Have you been to the ER, urgent care clinic since your last visit? Hospitalized since your last visit? Yes. [facility]    2. Have you seen or consulted any other health care providers outside of the [address] [place] [address] since your last visit? Include any pap smears or colon screening.  No

## 2021-06-15 NOTE — PROGRESS NOTES
Room 3    Visit Vitals  /88 (BP 1 Location: Left upper arm)   Pulse 88   Ht 5' 7\" (1.702 m)   Wt 215 lb (97.5 kg)   SpO2 97%   BMI 33.67 kg/m²     [facility]-address unknown     Covid 4-21    First time seeing cardiologist     Chest pain: yes but better now  Shortness of breath: yes but better  Edema: no  Palpitations: no  Dizziness: no    New diagnosis/Surgeries: no    ER/Hospitalizations: 5-20-21 CP, SOB

## 2022-03-18 PROBLEM — R65.10 SIRS (SYSTEMIC INFLAMMATORY RESPONSE SYNDROME) (HCC): Status: ACTIVE | Noted: 2021-04-16

## 2022-03-19 PROBLEM — Z86.16 HISTORY OF COVID-19: Status: ACTIVE | Noted: 2021-06-15

## 2022-03-19 PROBLEM — R09.02 HYPOXIA: Status: ACTIVE | Noted: 2021-04-16

## 2022-03-19 PROBLEM — J96.01 ACUTE RESPIRATORY FAILURE WITH HYPOXIA (HCC): Status: ACTIVE | Noted: 2021-04-21

## 2022-03-20 PROBLEM — J12.82 PNEUMONIA DUE TO COVID-19 VIRUS: Status: ACTIVE | Noted: 2021-04-16

## 2022-03-20 PROBLEM — U07.1 PNEUMONIA DUE TO COVID-19 VIRUS: Status: ACTIVE | Noted: 2021-04-16

## 2023-05-15 RX ORDER — ALBUTEROL SULFATE 90 UG/1
2 AEROSOL, METERED RESPIRATORY (INHALATION) EVERY 6 HOURS PRN
COMMUNITY
Start: 2021-04-21

## 2023-05-15 RX ORDER — DEXAMETHASONE 6 MG/1
6 TABLET ORAL
COMMUNITY
Start: 2021-04-21

## 2023-05-15 RX ORDER — GUAIFENESIN 600 MG/1
600 TABLET, EXTENDED RELEASE ORAL EVERY 12 HOURS
COMMUNITY
Start: 2021-04-21

## 2023-05-15 RX ORDER — ASCORBIC ACID 500 MG
TABLET ORAL
COMMUNITY

## 2023-05-15 RX ORDER — ZINC GLUCONATE 50 MG
50 TABLET ORAL DAILY
COMMUNITY
Start: 2021-04-22